# Patient Record
Sex: MALE | Race: OTHER | HISPANIC OR LATINO | URBAN - METROPOLITAN AREA
[De-identification: names, ages, dates, MRNs, and addresses within clinical notes are randomized per-mention and may not be internally consistent; named-entity substitution may affect disease eponyms.]

---

## 2019-06-02 ENCOUNTER — EMERGENCY (EMERGENCY)
Facility: HOSPITAL | Age: 39
LOS: 1 days | Discharge: HOME | End: 2019-06-02
Admitting: EMERGENCY MEDICINE
Payer: COMMERCIAL

## 2019-06-02 VITALS
DIASTOLIC BLOOD PRESSURE: 81 MMHG | RESPIRATION RATE: 16 BRPM | WEIGHT: 227.96 LBS | SYSTOLIC BLOOD PRESSURE: 124 MMHG | HEIGHT: 74 IN | HEART RATE: 65 BPM | TEMPERATURE: 98 F | OXYGEN SATURATION: 98 %

## 2019-06-02 DIAGNOSIS — R51 HEADACHE: ICD-10-CM

## 2019-06-02 PROCEDURE — 99284 EMERGENCY DEPT VISIT MOD MDM: CPT | Mod: 25

## 2019-06-02 PROCEDURE — 99284 EMERGENCY DEPT VISIT MOD MDM: CPT

## 2019-06-02 PROCEDURE — 96375 TX/PRO/DX INJ NEW DRUG ADDON: CPT

## 2019-06-02 PROCEDURE — 96374 THER/PROPH/DIAG INJ IV PUSH: CPT

## 2019-06-02 RX ORDER — SODIUM CHLORIDE 9 MG/ML
2000 INJECTION INTRAMUSCULAR; INTRAVENOUS; SUBCUTANEOUS ONCE
Refills: 0 | Status: COMPLETED | OUTPATIENT
Start: 2019-06-02 | End: 2019-06-02

## 2019-06-02 RX ORDER — METOCLOPRAMIDE HCL 10 MG
10 TABLET ORAL ONCE
Refills: 0 | Status: COMPLETED | OUTPATIENT
Start: 2019-06-02 | End: 2019-06-02

## 2019-06-02 RX ORDER — IBUPROFEN 200 MG
1 TABLET ORAL
Qty: 15 | Refills: 0
Start: 2019-06-02 | End: 2019-06-06

## 2019-06-02 RX ORDER — KETOROLAC TROMETHAMINE 30 MG/ML
15 SYRINGE (ML) INJECTION ONCE
Refills: 0 | Status: DISCONTINUED | OUTPATIENT
Start: 2019-06-02 | End: 2019-06-02

## 2019-06-02 RX ADMIN — SODIUM CHLORIDE 1000 MILLILITER(S): 9 INJECTION INTRAMUSCULAR; INTRAVENOUS; SUBCUTANEOUS at 13:01

## 2019-06-02 RX ADMIN — Medication 15 MILLIGRAM(S): at 13:02

## 2019-06-02 RX ADMIN — Medication 10 MILLIGRAM(S): at 13:01

## 2019-06-02 NOTE — ED PROVIDER NOTE - CARE PROVIDER_API CALL
Deshawn Lucio)  Neurology; Neuromuscular Medicine  [address], [address] [place] [address]  Phone: [phone]  Fax: [phone]  Follow Up Time:

## 2019-06-02 NOTE — ED PROVIDER NOTE - NSFOLLOWUPINSTRUCTIONS_ED_ALL_ED_FT
Cefalea migrañosa  Migraine Headache  Introducción  Image   Abi cefalea migrañosa es un dolor intenso y punzante en toney o ambos lados de la vincent. Las migrañas también pueden causar otros síntomas, arturo náuseas, vómitos y sensibilidad a la david y el ruido.    ¿Cuáles son las causas?  Hay ciertos factores que pueden provocar migrañas, arturo los siguientes:  Alcohol.  Fumar.  Medicamentos, por ejemplo:  Medicamentos para aliviar el dolor torácico (nitroglicerina).  Píldoras anticonceptivas.  Comprimidos de estrógeno.  Ciertos medicamentos para la presión arterial.  Quesos curados, chocolate o cafeína.  Los alimentos o las bebidas que contienen nitratos, glutamato, aspartamo o tiramina.  Realizar actividad física.  Otros factores que pueden provocar migraña incluyen los siguientes:  La menstruación  Embarazo.  Hambre.  Estrés, poco o demasiado sueño, o fatiga.  Los cambios climáticos.  ¿Qué incrementa el riesgo?  Los siguientes factores pueden hacer que usted sea más propenso a tener migrañas:  La edad. Los riesgos aumentan con la edad.  Antecedentes familiares de migraña.  Ser de rasheeda caucásica.  Depresión y ansiedad.  Obesidad.  Ser hayden.  Tener un agujero en el corazón (persistencia del agujero oval) u otros problemas cardíacos.  ¿Cuáles son los signos o los síntomas?  El principal síntoma de esta afección es el dolor intenso y punzante. El dolor:  Puede aparecer en cualquier región de la vincent, tanto de un lado arturo de ambos.  Puede interferir con las actividades de la ruddy cotidiana.  Puede empeorar con la actividad física.  Puede empeorar ante la exposición a luces brillantes o a ruidos jayson.  Otros síntomas pueden incluir lo siguiente:  Náuseas.  Vómitos.  Mareos.  Sensibilidad general a las luces brillantes, a los ruidos jayson o a los olores.  Antes de sufrir abi migraña, puede percibir señales de advertencia (aura). Un aura puede incluir:  Betty luces intermitentes o tener puntos ciegos.  Betty puntos brillantes, halos o líneas en zigzag.  Tener abi visión en túnel o visión borrosa.  Sentir entumecimiento u hormigueo.  Tener dificultad para hablar.  Debilidad muscular.  ¿Cómo se diagnostica?  La cefalea migrañosa se diagnostica en función de lo siguiente:  Meryl síntomas.  Un examen físico.  Estudios, arturo abi tomografía computarizada o abi resonancia magnética de la vincent. Estos estudios de diagnóstico por imágenes pueden ayudar a descartar otras causas de cefalea.  Abi muestra de líquido cefalorraquídeo (punción lumbar) para analizar (análisis de líquido cefalorraquídeo o análisis de LCR).  ¿Cómo se trata?  Las cefaleas migrañosas suelen tratarse con medicamentos que:  Alivian el dolor.  Alivian las náuseas.  Evitan la recurrencia de las migrañas.  El tratamiento también puede incluir lo siguiente:  Acupuntura.  Cambios en el estilo de ruddy, arturo evitar alimentos que provoquen migraña.  Siga estas instrucciones en nelson casa:  Medicamentos     [place] los medicamentos de venta gurvinder y los recetados solamente arturo se lo haya indicado el médico.  No conduzca ni use maquinaria pesada mientras celina analgésicos recetados.  A fin de prevenir o tratar el estreñimiento mientras celina analgésicos recetados, el médico puede recomendarle lo siguiente:  Noy suficiente líquido para mantener la orina alee o de color amarillo pálido.  Balwinder medicamentos recetados o de venta gurvinder.  Consumir alimentos ricos en fibra, arturo frutas y verduras frescas, cereales integrales y frijoles.  Limitar el consumo de alimentos con alto contenido de grasas y azúcares procesados, arturo alimentos fritos o dulces.  Estilo de ruddy     Evite el consumo de alcohol.  No consuma ningún producto que contenga nicotina o tabaco, arturo cigarrillos y cigarrillos electrónicos. Si necesita ayuda para dejar de fumar, consulte al médico.  Duerma arturo mínimo 8 horas todas las noches.  Evite las situaciones de estrés.  Instrucciones generales     Image   Lleve un registro diario para averiguar lo que puede provocar las cefaleas migrañosas. Por ejemplo, escriba:  Lo que usted come y alice.  Cuánto tiempo duerme.  Algún cambio en nelson dieta o en los medicamentos.  Si tiene abi migraña:  Evite los factores que empeoren los síntomas, arturo las luces brillantes.  Resulta útil acostarse en abi habitación oscura y silenciosa.  No conduzca vehículos ni opere maquinaria pesada.  Pregúntele al médico qué actividades son seguras para usted cuando tiene síntomas.  Concurra a todas las visitas de control arturo se lo haya indicado el médico. Quinton es importante.  Comuníquese con un médico si:  Tiene síntomas de migraña distintos o más intensos que los habituales.  Solicite ayuda de inmediato si:  La migraña se hace cada vez más intensa.  Tiene fiebre.  Presenta rigidez en el mady.  Tiene pérdida de visión.  Siente debilidad en los músculos o que no puede controlarlos.  Comienza a perder el equilibrio con frecuencia.  Comienza a tener dificultades para caminar.  Se desmaya.  Esta información no tiene arturo fin reemplazar el consejo del médico. Asegúrese de hacerle al médico cualquier pregunta que tenga.

## 2019-06-02 NOTE — ED ADULT TRIAGE NOTE - CHIEF COMPLAINT QUOTE
"I have been having headache for about 1 week and it feels like a pulling to the right side of my head with tingling. "

## 2019-06-02 NOTE — ED PROVIDER NOTE - CLINICAL SUMMARY MEDICAL DECISION MAKING FREE TEXT BOX
39 y/o male with right sided HA x2 weeks. No double vision, confusion, difficulty ambulating and no recent injury. Not worst HA of life without sudden onset. Do not suspect SAH. No hx of aneurysm. Well-appearing. No fever/stiff neck. NO recent hx of illness - do not suspect meningitis. Neuro intact. Pain treated and alleviated. Pt without signs of ams well appearing. advised to continue nsaid and follow up with Neurology. Pt agrees with plan and is requesting for dc as wife who is also a pt in the ed at the same time has just been discharge.

## 2019-06-03 ENCOUNTER — INBOUND DOCUMENT (OUTPATIENT)
Age: 39
End: 2019-06-03

## 2019-06-03 PROBLEM — Z00.00 ENCOUNTER FOR PREVENTIVE HEALTH EXAMINATION: Status: ACTIVE | Noted: 2019-06-03

## 2021-04-26 NOTE — ED PROVIDER NOTE - OBJECTIVE STATEMENT
Raj #[id number]  37 y/o male with no PMHx who is otherwise healthy is present in the ED c/o HA x2 weeks. Pt reports gradual onset of discomfort located on the right side of his head without radiation. Pt reports pain is constant and increases in severity and frequency associated with mild nausea. He describes an intermittent tingling sensation to the top of his head. He denies taking anything for his headaches. Pt denies the following: fever, recent illness, neck stiffness/back pain, syncope, dizziness/lightheadedness, vomiting, blurred vision, double vision, slurred speech, confusion, numbness/tingling of extremities, sob, chest pain, hx of tbi/seizures, difficulty with balance. Pt denies hx of migraines in the past. Female

## 2021-08-13 ENCOUNTER — APPOINTMENT (OUTPATIENT)
Dept: UROLOGY | Facility: CLINIC | Age: 41
End: 2021-08-13
Payer: MEDICAID

## 2021-08-13 ENCOUNTER — NON-APPOINTMENT (OUTPATIENT)
Age: 41
End: 2021-08-13

## 2021-08-13 VITALS
WEIGHT: 240 LBS | HEIGHT: 74 IN | HEART RATE: 67 BPM | SYSTOLIC BLOOD PRESSURE: 112 MMHG | BODY MASS INDEX: 30.8 KG/M2 | DIASTOLIC BLOOD PRESSURE: 75 MMHG | TEMPERATURE: 97.8 F

## 2021-08-13 PROCEDURE — 93975 VASCULAR STUDY: CPT

## 2021-08-13 PROCEDURE — 99204 OFFICE O/P NEW MOD 45 MIN: CPT | Mod: 25

## 2021-08-13 PROCEDURE — 99072 ADDL SUPL MATRL&STAF TM PHE: CPT

## 2021-08-13 NOTE — PHYSICAL EXAM
[Urethral Meatus] : meatus normal [Penis Abnormality] : normal uncircumcised penis [Urinary Bladder Findings] : the bladder was normal on palpation [Scrotum] : the scrotum was normal [Epididymis] : the epididymides were normal [Testes Tenderness] : no tenderness of the testes [Testes Mass (___cm)] : there were no testicular masses [FreeTextEntry1] : bialteral cord fullness. 4cc soft testes.

## 2021-08-13 NOTE — HISTORY OF PRESENT ILLNESS
[FreeTextEntry1] : 40M  Dianelys Dallas (34F). male has 18 year old. wife with previous  with another partner. negtavie female factor evalatuion. wife underwent teratoma surgery\par s/p 2021 bilateral varicocelectomy in DR\par LH 9.6\par FSH 16.5\par \par 2021: \par 1 ml/10 non-motile, 2 motile sperm\par repeat exam - unclear report ?azoospermia\par no additonal compalints\par

## 2021-08-13 NOTE — ASSESSMENT
[FreeTextEntry1] : hypogonadsim\par repeat scortal ultrasound - right 3.5mm varicocele\par no left sided varicocele\par hormonal evaluaiton - TT FSH E2 LH \par Y microdeletion\par karyotrype\par recommend IVF\par needs repeat SA with cryopreservation \par f/u after IVF discussion

## 2021-08-16 LAB
ESTRADIOL SERPL-MCNC: 31 PG/ML
FSH SERPL-MCNC: 18.1 IU/L
LH SERPL-ACNC: 16.5 IU/L
TESTOST BND SERPL-MCNC: 8.1 PG/ML
TESTOSTERONE TOTAL S: 296 NG/DL

## 2021-08-18 ENCOUNTER — NON-APPOINTMENT (OUTPATIENT)
Age: 41
End: 2021-08-18

## 2021-08-25 LAB — Y CHROMOSOME MICRODELETION, DNA ANALYSIS: NORMAL

## 2021-09-02 ENCOUNTER — APPOINTMENT (OUTPATIENT)
Dept: UROLOGY | Facility: CLINIC | Age: 41
End: 2021-09-02
Payer: MEDICAID

## 2021-09-02 VITALS — TEMPERATURE: 98.1 F | DIASTOLIC BLOOD PRESSURE: 80 MMHG | SYSTOLIC BLOOD PRESSURE: 129 MMHG | HEART RATE: 59 BPM

## 2021-09-02 PROCEDURE — 99214 OFFICE O/P EST MOD 30 MIN: CPT

## 2021-09-02 PROCEDURE — 99072 ADDL SUPL MATRL&STAF TM PHE: CPT

## 2021-09-02 NOTE — HISTORY OF PRESENT ILLNESS
[FreeTextEntry1] : returns for followup\par cryptozoospermia\par rare sperm noted\par now on clomid x few weeks\par underwent bialteral varicocelecotmy in  in 2/2021\par no clinical varicocele on exam\par wife now being seen for IVF eval

## 2021-09-02 NOTE — LETTER BODY
[Dear  ___] : Dear  [unfilled], [FreeTextEntry2] : Clay Armando MD\par [facility]\par [address] [facility]\par [address]\[place] [address] [FreeTextEntry1] : I had the pleasure of seeing TRAMAINE DALLAS, a 40 year old man,  to your patient Dianelys Dallas, in the office in consultation today. Please see the attached note for full details.\par \par Thank you very much for allowing me to participate in the care of this patient. If you have any questions please feel free to call me at any time. \par \par \par Sincerely yours,\par \par \par \par Abraham Castaneda MD, ANÍBAL\par Director, Male Fertility and Microsurgery\par  of Urology\par [facility]\par

## 2021-09-02 NOTE — ASSESSMENT
[FreeTextEntry1] : hypogonadism\par continue clomid \par TT FSH LH E2 today\par proceed to IVF \par await repeat SA results\par no additioanl reversible male factor abnormality\par if poor sperm noted proceed to TESE

## 2021-09-03 LAB
ALBUMIN SERPL ELPH-MCNC: 4.8 G/DL
ALP BLD-CCNC: 69 U/L
ALT SERPL-CCNC: 17 U/L
ANION GAP SERPL CALC-SCNC: 10 MMOL/L
AST SERPL-CCNC: 19 U/L
BASOPHILS # BLD AUTO: 0.01 K/UL
BASOPHILS NFR BLD AUTO: 0.2 %
BILIRUB SERPL-MCNC: 0.5 MG/DL
BUN SERPL-MCNC: 15 MG/DL
CALCIUM SERPL-MCNC: 9.9 MG/DL
CHLORIDE SERPL-SCNC: 103 MMOL/L
CO2 SERPL-SCNC: 27 MMOL/L
CREAT SERPL-MCNC: 1.08 MG/DL
EOSINOPHIL # BLD AUTO: 0.06 K/UL
EOSINOPHIL NFR BLD AUTO: 1.2 %
ESTRADIOL SERPL-MCNC: 51 PG/ML
FSH SERPL-MCNC: 38.4 IU/L
GLUCOSE SERPL-MCNC: 92 MG/DL
HCT VFR BLD CALC: 35.3 %
HGB BLD-MCNC: 11.9 G/DL
IMM GRANULOCYTES NFR BLD AUTO: 0.2 %
LH SERPL-ACNC: 30.7 IU/L
LYMPHOCYTES # BLD AUTO: 1.61 K/UL
LYMPHOCYTES NFR BLD AUTO: 32.3 %
MAN DIFF?: NORMAL
MCHC RBC-ENTMCNC: 31.7 PG
MCHC RBC-ENTMCNC: 33.7 GM/DL
MCV RBC AUTO: 94.1 FL
MONOCYTES # BLD AUTO: 0.47 K/UL
MONOCYTES NFR BLD AUTO: 9.4 %
NEUTROPHILS # BLD AUTO: 2.83 K/UL
NEUTROPHILS NFR BLD AUTO: 56.7 %
PLATELET # BLD AUTO: 220 K/UL
POTASSIUM SERPL-SCNC: 4.9 MMOL/L
PROT SERPL-MCNC: 7.5 G/DL
RBC # BLD: 3.75 M/UL
RBC # FLD: 13.9 %
SODIUM SERPL-SCNC: 140 MMOL/L
WBC # FLD AUTO: 4.99 K/UL

## 2021-09-09 LAB
TESTOST BND SERPL-MCNC: 10.9 PG/ML
TESTOSTERONE TOTAL S: 527 NG/DL

## 2021-11-06 ENCOUNTER — TRANSCRIPTION ENCOUNTER (OUTPATIENT)
Age: 41
End: 2021-11-06

## 2021-11-06 ENCOUNTER — INPATIENT (INPATIENT)
Facility: HOSPITAL | Age: 41
LOS: 0 days | Discharge: HOME | DRG: 175 | End: 2021-11-06
Attending: HOSPITALIST | Admitting: HOSPITALIST
Payer: MEDICAID

## 2021-11-06 VITALS
TEMPERATURE: 101 F | WEIGHT: 244.93 LBS | HEART RATE: 111 BPM | RESPIRATION RATE: 16 BRPM | SYSTOLIC BLOOD PRESSURE: 105 MMHG | DIASTOLIC BLOOD PRESSURE: 71 MMHG | OXYGEN SATURATION: 99 % | HEIGHT: 74 IN

## 2021-11-06 VITALS
TEMPERATURE: 99 F | RESPIRATION RATE: 18 BRPM | DIASTOLIC BLOOD PRESSURE: 64 MMHG | HEART RATE: 82 BPM | OXYGEN SATURATION: 95 % | SYSTOLIC BLOOD PRESSURE: 103 MMHG

## 2021-11-06 DIAGNOSIS — Z29.9 ENCOUNTER FOR PROPHYLACTIC MEASURES, UNSPECIFIED: ICD-10-CM

## 2021-11-06 DIAGNOSIS — R65.10 SYSTEMIC INFLAMMATORY RESPONSE SYNDROME (SIRS) OF NON-INFECTIOUS ORIGIN WITHOUT ACUTE ORGAN DYSFUNCTION: ICD-10-CM

## 2021-11-06 DIAGNOSIS — J90 PLEURAL EFFUSION, NOT ELSEWHERE CLASSIFIED: ICD-10-CM

## 2021-11-06 DIAGNOSIS — I26.99 OTHER PULMONARY EMBOLISM WITHOUT ACUTE COR PULMONALE: ICD-10-CM

## 2021-11-06 DIAGNOSIS — Z98.890 OTHER SPECIFIED POSTPROCEDURAL STATES: Chronic | ICD-10-CM

## 2021-11-06 DIAGNOSIS — J98.4 OTHER DISORDERS OF LUNG: ICD-10-CM

## 2021-11-06 LAB
ALBUMIN SERPL ELPH-MCNC: 4.7 G/DL — SIGNIFICANT CHANGE UP (ref 3.3–5)
ALP SERPL-CCNC: 78 U/L — SIGNIFICANT CHANGE UP (ref 40–120)
ALT FLD-CCNC: 20 U/L — SIGNIFICANT CHANGE UP (ref 10–45)
ANION GAP SERPL CALC-SCNC: 9 MMOL/L — SIGNIFICANT CHANGE UP (ref 5–17)
APTT BLD: 26.3 SEC — LOW (ref 27.5–35.5)
APTT BLD: >200 SEC — CRITICAL HIGH (ref 27.5–35.5)
AST SERPL-CCNC: 19 U/L — SIGNIFICANT CHANGE UP (ref 10–40)
BASOPHILS # BLD AUTO: 0.01 K/UL — SIGNIFICANT CHANGE UP (ref 0–0.2)
BASOPHILS NFR BLD AUTO: 0.1 % — SIGNIFICANT CHANGE UP (ref 0–2)
BILIRUB SERPL-MCNC: 0.5 MG/DL — SIGNIFICANT CHANGE UP (ref 0.2–1.2)
BUN SERPL-MCNC: 12 MG/DL — SIGNIFICANT CHANGE UP (ref 7–23)
CALCIUM SERPL-MCNC: 9.7 MG/DL — SIGNIFICANT CHANGE UP (ref 8.4–10.5)
CHLORIDE SERPL-SCNC: 101 MMOL/L — SIGNIFICANT CHANGE UP (ref 96–108)
CO2 SERPL-SCNC: 28 MMOL/L — SIGNIFICANT CHANGE UP (ref 22–31)
CREAT SERPL-MCNC: 1.26 MG/DL — SIGNIFICANT CHANGE UP (ref 0.5–1.3)
CRP SERPL-MCNC: 44.5 MG/L — HIGH (ref 0–4)
D DIMER BLD IA.RAPID-MCNC: 547 NG/ML DDU — HIGH
EOSINOPHIL # BLD AUTO: 0.02 K/UL — SIGNIFICANT CHANGE UP (ref 0–0.5)
EOSINOPHIL NFR BLD AUTO: 0.2 % — SIGNIFICANT CHANGE UP (ref 0–6)
FERRITIN SERPL-MCNC: 179 NG/ML — SIGNIFICANT CHANGE UP (ref 30–400)
GLUCOSE SERPL-MCNC: 104 MG/DL — HIGH (ref 70–99)
HCT VFR BLD CALC: 40.9 % — SIGNIFICANT CHANGE UP (ref 39–50)
HGB BLD-MCNC: 14.3 G/DL — SIGNIFICANT CHANGE UP (ref 13–17)
IMM GRANULOCYTES NFR BLD AUTO: 0.3 % — SIGNIFICANT CHANGE UP (ref 0–1.5)
INR BLD: 1.06 — SIGNIFICANT CHANGE UP (ref 0.88–1.16)
LACTATE SERPL-SCNC: 1 MMOL/L — SIGNIFICANT CHANGE UP (ref 0.5–2)
LYMPHOCYTES # BLD AUTO: 1.49 K/UL — SIGNIFICANT CHANGE UP (ref 1–3.3)
LYMPHOCYTES # BLD AUTO: 14.6 % — SIGNIFICANT CHANGE UP (ref 13–44)
MCHC RBC-ENTMCNC: 31.2 PG — SIGNIFICANT CHANGE UP (ref 27–34)
MCHC RBC-ENTMCNC: 35 GM/DL — SIGNIFICANT CHANGE UP (ref 32–36)
MCV RBC AUTO: 89.3 FL — SIGNIFICANT CHANGE UP (ref 80–100)
MONOCYTES # BLD AUTO: 1.11 K/UL — HIGH (ref 0–0.9)
MONOCYTES NFR BLD AUTO: 10.9 % — SIGNIFICANT CHANGE UP (ref 2–14)
NEUTROPHILS # BLD AUTO: 7.52 K/UL — HIGH (ref 1.8–7.4)
NEUTROPHILS NFR BLD AUTO: 73.9 % — SIGNIFICANT CHANGE UP (ref 43–77)
NRBC # BLD: 0 /100 WBCS — SIGNIFICANT CHANGE UP (ref 0–0)
NT-PROBNP SERPL-SCNC: 26 PG/ML — SIGNIFICANT CHANGE UP (ref 0–300)
PLATELET # BLD AUTO: 215 K/UL — SIGNIFICANT CHANGE UP (ref 150–400)
POTASSIUM SERPL-MCNC: 4 MMOL/L — SIGNIFICANT CHANGE UP (ref 3.5–5.3)
POTASSIUM SERPL-SCNC: 4 MMOL/L — SIGNIFICANT CHANGE UP (ref 3.5–5.3)
PROCALCITONIN SERPL-MCNC: 0.08 NG/ML — SIGNIFICANT CHANGE UP (ref 0.02–0.1)
PROT SERPL-MCNC: 8.5 G/DL — HIGH (ref 6–8.3)
PROTHROM AB SERPL-ACNC: 12.7 SEC — SIGNIFICANT CHANGE UP (ref 10.6–13.6)
RAPID RVP RESULT: SIGNIFICANT CHANGE UP
RBC # BLD: 4.58 M/UL — SIGNIFICANT CHANGE UP (ref 4.2–5.8)
RBC # FLD: 12.9 % — SIGNIFICANT CHANGE UP (ref 10.3–14.5)
SARS-COV-2 RNA SPEC QL NAA+PROBE: SIGNIFICANT CHANGE UP
SARS-COV-2 RNA SPEC QL NAA+PROBE: SIGNIFICANT CHANGE UP
SODIUM SERPL-SCNC: 138 MMOL/L — SIGNIFICANT CHANGE UP (ref 135–145)
TROPONIN T SERPL-MCNC: 0.01 NG/ML — SIGNIFICANT CHANGE UP (ref 0–0.01)
WBC # BLD: 10.18 K/UL — SIGNIFICANT CHANGE UP (ref 3.8–10.5)
WBC # FLD AUTO: 10.18 K/UL — SIGNIFICANT CHANGE UP (ref 3.8–10.5)

## 2021-11-06 PROCEDURE — 96367 TX/PROPH/DG ADDL SEQ IV INF: CPT

## 2021-11-06 PROCEDURE — 93005 ELECTROCARDIOGRAM TRACING: CPT

## 2021-11-06 PROCEDURE — 85610 PROTHROMBIN TIME: CPT

## 2021-11-06 PROCEDURE — 71275 CT ANGIOGRAPHY CHEST: CPT | Mod: 26,MG

## 2021-11-06 PROCEDURE — 84145 PROCALCITONIN (PCT): CPT

## 2021-11-06 PROCEDURE — 99053 MED SERV 10PM-8AM 24 HR FAC: CPT

## 2021-11-06 PROCEDURE — G1004: CPT

## 2021-11-06 PROCEDURE — 84484 ASSAY OF TROPONIN QUANT: CPT

## 2021-11-06 PROCEDURE — 71275 CT ANGIOGRAPHY CHEST: CPT | Mod: MG

## 2021-11-06 PROCEDURE — 99285 EMERGENCY DEPT VISIT HI MDM: CPT

## 2021-11-06 PROCEDURE — 85379 FIBRIN DEGRADATION QUANT: CPT

## 2021-11-06 PROCEDURE — 87040 BLOOD CULTURE FOR BACTERIA: CPT

## 2021-11-06 PROCEDURE — 0225U NFCT DS DNA&RNA 21 SARSCOV2: CPT

## 2021-11-06 PROCEDURE — 85730 THROMBOPLASTIN TIME PARTIAL: CPT

## 2021-11-06 PROCEDURE — 71045 X-RAY EXAM CHEST 1 VIEW: CPT

## 2021-11-06 PROCEDURE — 96361 HYDRATE IV INFUSION ADD-ON: CPT

## 2021-11-06 PROCEDURE — 71045 X-RAY EXAM CHEST 1 VIEW: CPT | Mod: 26

## 2021-11-06 PROCEDURE — 99223 1ST HOSP IP/OBS HIGH 75: CPT | Mod: GC

## 2021-11-06 PROCEDURE — 83605 ASSAY OF LACTIC ACID: CPT

## 2021-11-06 PROCEDURE — 80053 COMPREHEN METABOLIC PANEL: CPT

## 2021-11-06 PROCEDURE — U0005: CPT

## 2021-11-06 PROCEDURE — 93010 ELECTROCARDIOGRAM REPORT: CPT

## 2021-11-06 PROCEDURE — 82728 ASSAY OF FERRITIN: CPT

## 2021-11-06 PROCEDURE — U0003: CPT

## 2021-11-06 PROCEDURE — 83880 ASSAY OF NATRIURETIC PEPTIDE: CPT

## 2021-11-06 PROCEDURE — 86140 C-REACTIVE PROTEIN: CPT

## 2021-11-06 PROCEDURE — 96365 THER/PROPH/DIAG IV INF INIT: CPT

## 2021-11-06 PROCEDURE — 85025 COMPLETE CBC W/AUTO DIFF WBC: CPT

## 2021-11-06 PROCEDURE — 36415 COLL VENOUS BLD VENIPUNCTURE: CPT

## 2021-11-06 PROCEDURE — 99221 1ST HOSP IP/OBS SF/LOW 40: CPT | Mod: GC

## 2021-11-06 RX ORDER — HEPARIN SODIUM 5000 [USP'U]/ML
4500 INJECTION INTRAVENOUS; SUBCUTANEOUS EVERY 6 HOURS
Refills: 0 | Status: DISCONTINUED | OUTPATIENT
Start: 2021-11-06 | End: 2021-11-06

## 2021-11-06 RX ORDER — ACETAMINOPHEN 500 MG
650 TABLET ORAL EVERY 6 HOURS
Refills: 0 | Status: DISCONTINUED | OUTPATIENT
Start: 2021-11-06 | End: 2021-11-06

## 2021-11-06 RX ORDER — HEPARIN SODIUM 5000 [USP'U]/ML
9000 INJECTION INTRAVENOUS; SUBCUTANEOUS ONCE
Refills: 0 | Status: COMPLETED | OUTPATIENT
Start: 2021-11-06 | End: 2021-11-06

## 2021-11-06 RX ORDER — SODIUM CHLORIDE 9 MG/ML
1000 INJECTION INTRAMUSCULAR; INTRAVENOUS; SUBCUTANEOUS ONCE
Refills: 0 | Status: COMPLETED | OUTPATIENT
Start: 2021-11-06 | End: 2021-11-06

## 2021-11-06 RX ORDER — APIXABAN 2.5 MG/1
1 TABLET, FILM COATED ORAL
Qty: 75 | Refills: 0
Start: 2021-11-06

## 2021-11-06 RX ORDER — INFLUENZA VIRUS VACCINE 15; 15; 15; 15 UG/.5ML; UG/.5ML; UG/.5ML; UG/.5ML
0.5 SUSPENSION INTRAMUSCULAR ONCE
Refills: 0 | Status: DISCONTINUED | OUTPATIENT
Start: 2021-11-06 | End: 2021-11-06

## 2021-11-06 RX ORDER — CEFTRIAXONE 500 MG/1
1000 INJECTION, POWDER, FOR SOLUTION INTRAMUSCULAR; INTRAVENOUS ONCE
Refills: 0 | Status: COMPLETED | OUTPATIENT
Start: 2021-11-06 | End: 2021-11-06

## 2021-11-06 RX ORDER — HEPARIN SODIUM 5000 [USP'U]/ML
INJECTION INTRAVENOUS; SUBCUTANEOUS
Qty: 25000 | Refills: 0 | Status: DISCONTINUED | OUTPATIENT
Start: 2021-11-06 | End: 2021-11-06

## 2021-11-06 RX ORDER — ACETAMINOPHEN 500 MG
975 TABLET ORAL ONCE
Refills: 0 | Status: COMPLETED | OUTPATIENT
Start: 2021-11-06 | End: 2021-11-06

## 2021-11-06 RX ORDER — ENOXAPARIN SODIUM 100 MG/ML
100 INJECTION SUBCUTANEOUS EVERY 12 HOURS
Refills: 0 | Status: DISCONTINUED | OUTPATIENT
Start: 2021-11-06 | End: 2021-11-06

## 2021-11-06 RX ORDER — HEPARIN SODIUM 5000 [USP'U]/ML
9000 INJECTION INTRAVENOUS; SUBCUTANEOUS EVERY 6 HOURS
Refills: 0 | Status: DISCONTINUED | OUTPATIENT
Start: 2021-11-06 | End: 2021-11-06

## 2021-11-06 RX ORDER — AZITHROMYCIN 500 MG/1
500 TABLET, FILM COATED ORAL ONCE
Refills: 0 | Status: COMPLETED | OUTPATIENT
Start: 2021-11-06 | End: 2021-11-06

## 2021-11-06 RX ORDER — CLOMIPHENE CITRATE 50 MG/1
1 TABLET ORAL
Qty: 0 | Refills: 0 | DISCHARGE

## 2021-11-06 RX ADMIN — CEFTRIAXONE 1000 MILLIGRAM(S): 500 INJECTION, POWDER, FOR SOLUTION INTRAMUSCULAR; INTRAVENOUS at 04:30

## 2021-11-06 RX ADMIN — AZITHROMYCIN 500 MILLIGRAM(S): 500 TABLET, FILM COATED ORAL at 05:15

## 2021-11-06 RX ADMIN — CEFTRIAXONE 100 MILLIGRAM(S): 500 INJECTION, POWDER, FOR SOLUTION INTRAMUSCULAR; INTRAVENOUS at 03:58

## 2021-11-06 RX ADMIN — AZITHROMYCIN 255 MILLIGRAM(S): 500 TABLET, FILM COATED ORAL at 04:52

## 2021-11-06 RX ADMIN — HEPARIN SODIUM 9000 UNIT(S): 5000 INJECTION INTRAVENOUS; SUBCUTANEOUS at 05:27

## 2021-11-06 RX ADMIN — Medication 975 MILLIGRAM(S): at 01:50

## 2021-11-06 RX ADMIN — SODIUM CHLORIDE 1000 MILLILITER(S): 9 INJECTION INTRAMUSCULAR; INTRAVENOUS; SUBCUTANEOUS at 03:41

## 2021-11-06 RX ADMIN — SODIUM CHLORIDE 1000 MILLILITER(S): 9 INJECTION INTRAMUSCULAR; INTRAVENOUS; SUBCUTANEOUS at 01:51

## 2021-11-06 RX ADMIN — Medication 975 MILLIGRAM(S): at 04:04

## 2021-11-06 RX ADMIN — ENOXAPARIN SODIUM 100 MILLIGRAM(S): 100 INJECTION SUBCUTANEOUS at 12:20

## 2021-11-06 RX ADMIN — HEPARIN SODIUM 2000 UNIT(S)/HR: 5000 INJECTION INTRAVENOUS; SUBCUTANEOUS at 05:29

## 2021-11-06 NOTE — H&P ADULT - NSHPSOCIALHISTORY_GEN_ALL_CORE
Pt denies any tobacco, alcohol, drug or marijuana use  Lives at home with wife, can perform all ADLs and IADLs independently, ambulates without assistance  Works as a   Does not regularly exercise

## 2021-11-06 NOTE — H&P ADULT - PROBLEM SELECTOR PLAN 2
F: none  E: K<4, Mg<2  N: Regular  D: Heparin gtt    Code: Full  Dispo: ANGIE Meeting 2/4 SIRS criteria, fever to 100.7F and  in ED; pt no longer meeting SIRS criteria. CXR appeared to show LLL consolidation and CT angio preliminary read suggested peripheral wedge-shaped consolidation with ground-glass opacity likely PNA and infarction and possible cavitation and small L pleural effusion. Pt is unvaccinated for COVID, however COVID negative x2, RVP negative, no symptoms of URI. In the absence of elevated WBC and other URI symptoms, fever and tachycardia is more likely 2/2 to acute PE vs PNA or other infectious process. S/P CTX 1g and azithromycin 500mg in ED.   Plan:   - Monitor for systemic signs of infection Meeting 2/4 SIRS criteria, fever to 100.7F and  in ED; pt no longer meeting SIRS criteria. CXR appeared to show LLL consolidation and CT angio preliminary read suggested peripheral wedge-shaped consolidation with ground-glass opacity likely PNA and infarction and possible cavitation and small L pleural effusion. Pt is unvaccinated for COVID, however COVID negative x2, RVP negative, no symptoms of URI. In the absence of elevated WBC and other URI symptoms, fever and tachycardia is more likely 2/2 to acute PE vs PNA or other infectious process. S/P CTX 1g and azithromycin 500mg in ED.   Plan:   - F/U blood cx  - Monitor for systemic signs of infection

## 2021-11-06 NOTE — DISCHARGE NOTE NURSING/CASE MANAGEMENT/SOCIAL WORK - PATIENT PORTAL LINK FT
You can access the FollowMyHealth Patient Portal offered by [facility] by registering at the following website: [email or web address]. By joining [facility] FollowMyHealth portal, you will also be able to view your health information using other applications (apps) compatible with our system.

## 2021-11-06 NOTE — DISCHARGE NOTE PROVIDER - NSDCFUADDAPPT_GEN_ALL_CORE_FT
Please call the office of Dr. Sara Klein to set up a follow-up appointment within the next 1-2 weeks. If the above number doesn't work, call [phone] to get to the pulmonology fellow clinic.

## 2021-11-06 NOTE — DISCHARGE NOTE PROVIDER - NSDCMRMEDTOKEN_GEN_ALL_CORE_FT
clomiPHENE 50 mg oral tablet: 1 tab(s) orally once a day   mg oral tablet: 1 tab(s) orally 3 times a day    Eliquis Starter Pack for Treatment of DVT and PE 5 mg oral tablet: 1 tab(s) orally 2 times a day

## 2021-11-06 NOTE — ED ADULT TRIAGE NOTE - OTHER COMPLAINTS
L sided CP x 3-4 days, "stabbing", worse with coughing or laying down, (+) dry cough and sore throat

## 2021-11-06 NOTE — DISCHARGE NOTE PROVIDER - HOSPITAL COURSE
#Discharge: do not delete    Patient is 40 yo M with past medical history of clomiphene use  Presented with chest pain, found to have acute pulmonary embolism  Problem List/Main Diagnoses (system-based):   Pulmonary embolism, acute, provoked by sedentary work and clomiphene  - Eliquis  - f/u [facility] Pulmonology clinic ([phone])  Inpatient treatment course:   41 M  taking clomiphene for infertility presented with pleuritic chest pain of 5-6 days. Low grade fevers and tachycardia on admission with positive D-dimer, CT PE positive for LLL embolism with infarction and local necrosis. Started on heparin drip then transitioned to enoxaparin.   New medications: Apixaban 10mg bid  Labs to be followed outpatient:   Exam to be followed outpatient:

## 2021-11-06 NOTE — ED PROVIDER NOTE - MUSCULOSKELETAL, MLM
Spine appears normal, range of motion is not limited, no muscle or joint tenderness, distal pulses good, sensation and motor intact

## 2021-11-06 NOTE — DISCHARGE NOTE PROVIDER - CARE PROVIDER_API CALL
Sara Klein (DO)  Critical Care Medicine; Internal Medicine; Pulmonary Disease  [address] [address]  Phone: [phone]  Fax: [phone]  Follow Up Time: 2 weeks

## 2021-11-06 NOTE — CONSULT NOTE ADULT - ASSESSMENT
40yo M minimal former-smoker, not COVID vaccinated, with hx of ?male infertility (on Clomiphene),   Presents with several days of dry cough and pleuritic chest pain,   Found with acute LLL PE with area of pulmonary infarction/cavitation     #Pulmonary Embolism - LLL; sPESI 0; Likely low-risk given HD-stability and lack of elevated Trop or BNP (no TTE to definitively rule-out RH strain, however unlikely); Likely provoked in the setting of Clomiphene use and reduced mobility  #r/o Pulmonary Infarction vs Pulmonary Cavitation - Area of likely lung infarction in area of LLL Pulmonary Embolism; no signs/symptoms to suggest cavitary lung lesion given lack of fevers/chills, weight loss, poor appetite, immunosuppression or otherwise high-risk behavior for such conditions; Presence of lung infarction does not portend a worse prognosis in the setting of PE     - Can transition to DOAC for discharge (anticipate 3-6 months of tx given likely provoked nature)    - Can follow-up outpatient Pulmonary for further symptom monitoring and to repeat CT imaging in 4-8 weeks to ensure resolution/improvement of lung infarction/cavitation   - No need for TTE or LE Doppler U/S during this hospitalization   - No indication for Abx or further respiratory work-up   - Stressed on importance of stopping Clomiphene use       Can follow-up with Pulmonary Dr. Sara Klein ([phone]) in 1-2 weeks

## 2021-11-06 NOTE — ED ADULT NURSE NOTE - SUICIDE SCREENING QUESTION 3
Pt here in clinic for Gilenya observation.  Pre-EKG obtained and results verbally given to Dr. Sands over the phone and order received to give 0.5mg Gilenya, medications reviewed, and baseline vitals obtained (to be performed q1 hour for 6 hours).  Pt observed for bradycardia and decreased HR.  Pt remained in clinic the entire duration of the observation without any issues.  Labs drawn per protocol.  Post-EKG obtained and evaluated by Dr. Sands and pt instructed to take 0.5mg Gilenya daily.  Upon discharge, pt instructed to take one tablet Gilenya daily and to call MD if any issues.      Patient denies any side effects at this time. Patient d/c ambulatory at this time.       
No

## 2021-11-06 NOTE — DISCHARGE NOTE NURSING/CASE MANAGEMENT/SOCIAL WORK - NSDCFUADDAPPT_GEN_ALL_CORE_FT
Please call the office of Dr. Sara Klein to set up a follow-up appointment within the next 1-2 weeks. If the above number doesn't work, call [phone] to get to the pulmonology fellow clinic.

## 2021-11-06 NOTE — H&P ADULT - ATTENDING COMMENTS
Patient was seen and examined at bedside on 11/6/2021 at 11 am. He reports continued L sided chest pressure. Denies SOB. ROS is otherwise negative. Vitals, labwork and pertinent imaging reviewed - CT PE as above, EKG showing sinus tachycardia; troponin wnl, BNP - wnl. Physical exam as above.     Plan - pulm consulted given possible cavitary lesion?    -Possible provoked in setting of hormonal therapy?  -Will likely start on Eliquis and plan for d/c pending final pulm recs    Total time spent ~ 32 minutes

## 2021-11-06 NOTE — H&P ADULT - PROBLEM SELECTOR PLAN 1
Pt presented to the ED with dry cough and chest pain on deep inhalation for 5 days. Found to have low grade fever to 100.7 in ED. CT angio preliminary findings of acute segmental/subsegmental PE in the L lower lobe with peripheral wedge-shaped consolidation with ground-glass opacity likely PNA and infarction and possible cavitation and small L pleural effusion. D-Dimer 547. WBC within normal limits, with no symptoms of sore throat, rhinorrhea, dyspnea. Pt is unvaccinated for COVID. In the setting of cough, fever and PE, r/o COVID PNA vs CAP as well. S/P CTX 1g and azithromycin 500mg in ED.  Plan:  - Cont heparin gtt, f/u 10 am PTT (goal 58-99)  - F/U repeat COVID   - Pt presented to the ED with dry cough and chest pain on deep inhalation for 5 days. CT angio preliminary findings of acute segmental/subsegmental PE in the L lower lobe with peripheral wedge-shaped consolidation with ground-glass opacity likely PNA and infarction and possible cavitation and small L pleural effusion. D-Dimer 547. Denies hx of clotting or family hx of clotting conditions. Pt has been using clomiphene for 6 weeks and works as a , seated for up to 10 hrs a day. Possibly provoked by use of clomiphene with sedentary lifestyle making pt more prone to clotting. EKG, NSR with no evidence of R heart strain, PESI score of 69.  Plan:  - Cont heparin gtt, f/u PTT q6hr to therapeutic x3 (goal 58-99)  - F/U B/L LE doppler Pt presented to the ED with dry cough and chest pain on deep inhalation for 5 days. CT angio preliminary findings of acute segmental/subsegmental PE in the L lower lobe with peripheral wedge-shaped consolidation with ground-glass opacity likely PNA and infarction and possible cavitation and small L pleural effusion. D-Dimer 547. Denies hx of clotting or family hx of clotting conditions. Pt has been using clomiphene for 6 weeks and works as a , seated for up to 10 hrs a day. Possibly provoked by use of clomiphene with sedentary lifestyle making pt more prone to clotting. EKG, NSR with no evidence of R heart strain, PESI score of 69.  Plan:  - Cont heparin gtt, f/u PTT q6hr to therapeutic x3 (goal 58-99)  - F/U B/L LE doppler  - F/U pulm recs Pt presented to the ED with dry cough and chest pain on deep inhalation for 5 days. CT angio preliminary findings of acute segmental/subsegmental PE in the L lower lobe with peripheral wedge-shaped consolidation with ground-glass opacity likely PNA and infarction and possible cavitation and small L pleural effusion. D-Dimer 547. Denies hx of clotting or family hx of clotting conditions. Pt has been using clomiphene for 6 weeks and works as a , seated for up to 10 hrs a day. Possibly provoked by use of clomiphene with sedentary lifestyle making pt more prone to clotting. EKG, NSR with no evidence of R heart strain, PESI score of 69.  Plan:  - Cont heparin gtt, f/u PTT q6hr to therapeutic x3 (goal 58-99)  - F/U B/L LE doppler  - F/U pulm recs, CT suspicious for cavitary lesion  - F/U TTE Pt presented to the ED with dry cough and chest pain on deep inhalation for 5 days. CT angio preliminary findings of acute segmental/subsegmental PE in the L lower lobe with peripheral wedge-shaped consolidation with ground-glass opacity likely PNA and infarction and possible cavitation and small L pleural effusion. D-Dimer 547. Denies hx of clotting or family hx of clotting conditions. Pt has been using clomiphene for 6 weeks and works as a , seated for up to 10 hrs a day. Possibly provoked by use of clomiphene with sedentary lifestyle making pt more prone to clotting. EKG, NSR with no evidence of R heart strain, PESI score of 69.  Plan:  - Transition from heparin gtt to lovenox 100mg q12hrs per pulm recs  - F/U B/L LE doppler  - F/U pulm recs, CT suspicious for cavitary lesion  - F/U TTE

## 2021-11-06 NOTE — DISCHARGE NOTE PROVIDER - NSDCCPCAREPLAN_GEN_ALL_CORE_FT
PRINCIPAL DISCHARGE DIAGNOSIS  Diagnosis: Pulmonary embolus with infarction  Assessment and Plan of Treatment: You had a blood clot in your lungs that caused some local areas in the bottom left lung to die. The area will heal over time, but you need to take blood thinning medication to allow the clot to resolve.  Please take your Eliquis 2 tablets twice a day for 1 week starting tonight, 11/6. Starting on the evening of 11/13, take 1 tablet twice a day. Please call Dr. Klein's office to follow up so she can prescribe more Eliquis and determine when to get a repeat CT scan.      SECONDARY DISCHARGE DIAGNOSES  Diagnosis: Pneumonia  Assessment and Plan of Treatment: There was concern for pneumonia, but you do not need antibiotics, all the areas of injury are from the clot.  If you develop a cough, fever, chills, or start coughing up blood. Come back to the emergency room right away, because a pneumonia may have developed from the injured areas of lung.

## 2021-11-06 NOTE — ED PROVIDER NOTE - CLINICAL SUMMARY MEDICAL DECISION MAKING FREE TEXT BOX
40 y/o m no PMHx, no recent medication taken, c/o dry cough and pleuritic chest pain, pt febrile, tachycardic, no labored breathing, no respiratory distress, not vaccinated for COVID, Plan for labs, XR, provide Tylenol, give hydration to pt, pt possibly covid, pna, or PE. Dispo per results and reevaluation 42 y/o m no PMHx, no recent medication taken, c/o dry cough and pleuritic chest pain, pt febrile, tachycardic, no labored breathing, no respiratory distress, not vaccinated for COVID, Plan: labs, IVF, tylenol, CXR, high possibility of pneumonia/PE/covid+ discussed with pt.  Dispo per results and reevaluation

## 2021-11-06 NOTE — H&P ADULT - NSHPPHYSICALEXAM_GEN_ALL_CORE
VITALS: T(C): 36.9 (11-06-21 @ 05:17), Max: 38.2 (11-06-21 @ 00:18)  T(F): 98.4 (11-06-21 @ 05:17), Max: 100.7 (11-06-21 @ 00:18)  HR: 82 (11-06-21 @ 05:17) (82 - 111)  BP: 107/70 (11-06-21 @ 05:17) (105/71 - 108/69)  ABP: --  ABP(mean): --  RR: 18 (11-06-21 @ 05:17) (16 - 18)  SpO2: 95% (11-06-21 @ 05:17) (95% - 99%)    GENERAL: NAD, lying in bed comfortably  HEAD:  Atraumatic, Normocephalic  EYES: EOMI, PERRLA, conjunctiva and sclera clear  ENT: Moist mucous membranes  NECK: Supple, No JVD  CHEST/LUNG: Clear to auscultation bilaterally; No rales, rhonchi, wheezing, or rubs. Unlabored respirations  HEART: Regular rate and rhythm; No murmurs, rubs, or gallops  ABDOMEN: Bowel sounds present; Soft, Nontender, Nondistended. No hepatomegally  EXTREMITIES:  2+ Peripheral Pulses, brisk capillary refill. No clubbing, cyanosis, or edema  NERVOUS SYSTEM:  Alert & Oriented X3, speech clear. No deficits   MSK: FROM all 4 extremities, full and equal strength  SKIN: No rashes or lesions VITALS: T(C): 36.9 (11-06-21 @ 05:17), Max: 38.2 (11-06-21 @ 00:18)  T(F): 98.4 (11-06-21 @ 05:17), Max: 100.7 (11-06-21 @ 00:18)  HR: 82 (11-06-21 @ 05:17) (82 - 111)  BP: 107/70 (11-06-21 @ 05:17) (105/71 - 108/69)  ABP: --  ABP(mean): --  RR: 18 (11-06-21 @ 05:17) (16 - 18)  SpO2: 95% (11-06-21 @ 05:17) (95% - 99%)    GENERAL: NAD, lying in bed comfortably  HEAD:  Atraumatic, Normocephalic  EYES: EOMI, PERRLA, conjunctiva and sclera clear  ENT: Moist mucous membranes  NECK: Supple, No JVD  CHEST/LUNG: Reproducible L chest pain, lungs CTA bilaterally; No rales, rhonchi, wheezing, or rubs. Unlabored respirations  HEART: S1/S2, Regular rate and rhythm; No murmurs, rubs, or gallops  ABDOMEN: Bowel sounds present; Soft, Nontender, Nondistended. No masses or hepatosplenomegaly  EXTREMITIES:  2+ Peripheral Pulses, brisk capillary refill. No clubbing, cyanosis, or edema  NERVOUS SYSTEM:  Alert & Oriented X3, speech clear. No deficits   MSK: FROM all 4 extremities, full and equal strength  SKIN: No rashes or lesions

## 2021-11-06 NOTE — H&P ADULT - HISTORY OF PRESENT ILLNESS
42 y/o M (Bel[place] speaking) with no significant medical hx presented to the ED with complaints of dry cough, chest pain on deep inhalation for 5 days.     In the ED:  Initial vital signs: T: 100.7F, HR: 111, BP: 105/71, R: 16, SpO2: 99% on RA  Labs: significant for d-dimer 547, PTT 26.3, CRP 44.5  Imaging:   CT angio: *prelim* acute segmental/subsegmental LLL PE, large peripheral wedge-shaped consolidation in the left lower lobe with adjacent ground glass opacity, likely a combination of pneumonia and pulmonary infarction. Some gas lucency within the consolidated lung may signify cavitation. Small L pleural effusion  EKG: NSR   Medications: heparin gtt, CTX 1g, azithromycin 500mg  Consults: none  40 y/o M ([place]an speaking) with no significant medical hx presented to the ED with complaints of dry cough, chest pain on deep inhalation for 5 days. Pt states that he has been taking clomiphene for 6 weeks for male infertility and stopped taking it on 11/1 at onset of chest pain. Pain is worse with deep inhalation, coughing, burping and bearing down. At this time, he states that the pain is a 5/10 and describes it as sharp but only present with coughing and when pressure is applied to the area. Pain has been progressively worsening over the past 5-6 days and he began to "feel hot" but was uncertain of whether or not he had a fever while at home. He reports no sick contacts, denies sputum production rhinorrhea, hemoptysis, dyspnea, syncope, new leg swelling or pain.     In the ED:  Initial vital signs: T: 100.7F, HR: 111, BP: 105/71, R: 16, SpO2: 99% on RA  Labs: significant for d-dimer 547, CRP 44.5  Imaging:   CXR: LLL consolidation  CT angio: *prelim* acute segmental/subsegmental LLL PE, large peripheral wedge-shaped consolidation in the left lower lobe with adjacent ground glass opacity, likely a combination of pneumonia and pulmonary infarction. Some gas lucency within the consolidated lung may signify cavitation. Small L pleural effusion  EKG: NSR   Medications: heparin gtt, CTX 1g, azithromycin 500mg  Consults: none  42 y/o M ([place] speaking) with no significant medical hx presented to the ED with complaints of dry cough, chest pain on deep inhalation for 5 days. Pt states that he has been taking clomiphene for 6 weeks for male infertility and stopped taking it on 11/1 at onset of chest pain. Pain is worse with deep inhalation, coughing, burping and bearing down. At this time, he states that the pain is a 5/10 and describes it as sharp but only present with coughing and when pressure is applied to the area. Pain has been progressively worsening over the past 5-6 days and he began to "feel hot" but was uncertain of whether or not he had a fever while at home. He reports no sick contacts, denies sputum production rhinorrhea, hemoptysis, dyspnea, syncope, new leg swelling or pain.     In the ED:  Initial vital signs: T: 100.7F, HR: 111, BP: 105/71, R: 16, SpO2: 99% on RA  Labs: significant for d-dimer 547, CRP 44.5  Imaging:   CXR: LLL consolidation  CT angio: *prelim* acute segmental/subsegmental LLL PE, large peripheral wedge-shaped consolidation in the left lower lobe with adjacent ground glass opacity, likely a combination of pneumonia and pulmonary infarction. Some gas lucency within the consolidated lung may signify cavitation. Small L pleural effusion  EKG: NSR   Medications: heparin gtt, CTX 1g, azithromycin 500mg, tylenol  Consults: none

## 2021-11-06 NOTE — CONSULT NOTE ADULT - ATTENDING COMMENTS
42 yo M with hx of varicocele and androgen insufficiency for which he was taking clomiphine for 1 month until he developed pleuritic chest pain 6 days ago. + mild dry cough, no sig SOB, no constitutional symptoms, + low grade fever, no phlegm. , in car for 10 hrs/day. Found to have LLL segmental/subsegmental PE with large area of wedge shape infiltrate, likely infarction. More common to see infarction in distal emboli and in younger patients, he is otherwise hemodynamically stable and well appearing. Neg trop, neg BNP, EKG borderline sinus tachy, no RV strain. US of lungs as above, area of necrosis visualized, no obvious cavitation. Low concern for infection.     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 Pt should be instructed to return to ED if he develops worsening SOB, fever, or productive cough  - Can follow up with me in clinic in 1-2 weeks

## 2021-11-06 NOTE — H&P ADULT - NSHPLABSRESULTS_GEN_ALL_CORE
.  LABS:                         14.3   10.18 )-----------( 215      ( 06 Nov 2021 01:39 )             40.9     11-06    138  |  101  |  12  ----------------------------<  104<H>  4.0   |  28  |  1.26    Ca    9.7      06 Nov 2021 01:39    TPro  8.5<H>  /  Alb  4.7  /  TBili  0.5  /  DBili  x   /  AST  19  /  ALT  20  /  AlkPhos  78  11-06    PT/INR - ( 06 Nov 2021 01:39 )   PT: 12.7 sec;   INR: 1.06          PTT - ( 06 Nov 2021 01:39 )  PTT:26.3 sec    Serum Pro-Brain Natriuretic Peptide: 26 pg/mL (11-06 @ 04:40)    Lactate, Blood: 1.0 mmol/L (11-06 @ 01:39)      RADIOLOGY, EKG & ADDITIONAL TESTS: Reviewed.

## 2021-11-06 NOTE — ED PROVIDER NOTE - OBJECTIVE STATEMENT
40 y/o m with no PMHx. Pt c/o pain when he coughs and takes deep breaths. pt has pain in the lower left rib region. Pt states having a dry cough (5 days), today the patient feels being feverish and thinks he is hot but did not measure his temp and has a mild HA. Pt denies having a sore throat, rhinorrhea, rash, diarrhea or SOB.

## 2021-11-06 NOTE — H&P ADULT - ASSESSMENT
42 y/o M with no significant past medical hx presents with complaints of dry cough and chest pain with deep inhalation likely 2/2 to LLL new pulmonary embolism compounded by possible PNA. 40 y/o M with no significant past medical hx presents with complaints of dry cough and chest pain with deep inhalation likely 2/2 to LLL new pulmonary embolism possibly provoked by use of medication clomiphene. 42 y/o M with no significant past medical hx presents with complaints of dry cough and chest pain with deep inhalation found to have new LLL pulmonary embolism possibly provoked by use of medication clomiphene with clot risk increased by sedentary lifestyle. Also found to have suspicious cavitary lesion.

## 2021-11-06 NOTE — H&P ADULT - NSHPREVIEWOFSYSTEMS_GEN_ALL_CORE
CONSTITUTIONAL: No weakness, fevers or chills  EYES/ENT: No visual changes;  No vertigo or throat pain   NECK: No pain or stiffness  RESPIRATORY: No cough, wheezing, hemoptysis; No shortness of breath  CARDIOVASCULAR: No chest pain or palpitations  GASTROINTESTINAL: No abdominal or epigastric pain. No nausea, vomiting, or hematemesis; No diarrhea or constipation. No melena or hematochezia.  GENITOURINARY: No dysuria, frequency or hematuria  NEUROLOGICAL: No numbness or weakness  SKIN: No itching, burning, rashes, or lesions   All other review of systems is negative unless indicated above. CONSTITUTIONAL: (-) weakness, fevers or chills  EYES/ENT: (-) No visual changes;  No vertigo or throat pain   NECK: (-) pain or stiffness  RESPIRATORY: (+) cough (-) wheezing, hemoptysis, shortness of breath  CARDIOVASCULAR: (+) chest pain, (-) palpitations  GASTROINTESTINAL: (-) abdominal or epigastric pain. (-) nausea, vomiting, or hematemesis; (-) diarrhea or constipation. (-) melena or hematochezia.  GENITOURINARY: (-)dysuria, frequency or hematuria  NEUROLOGICAL: (-) numbness or weakness  SKIN: (-) itching, burning, rashes, or lesions   All other review of systems is negative unless indicated above.

## 2021-11-06 NOTE — CONSULT NOTE ADULT - SUBJECTIVE AND OBJECTIVE BOX
PULMONARY SERVICE INITIAL CONSULT NOTE  -------------------------------------------------------------    HPI:     40yo M minimal former-smoker, not COVID vaccinated, with hx of ?male infertility (on Clomiphene),   Presents with several days of dry cough and pleuritic chest pain,   Found with acute LLL PE with area of pulmonary infarction/cavitation     --    Pt is predominantly [place]-speaking. Reports near 1 week of mostly dry cough associated with left-sided pleuritic chest pain. No fevers/chills, HA, dizziness, SOB, hemoptysis, palpitations, abd pain, N/V, bowel changes, ext swelling, recent travel, or sick contacts. No hx of similar symptoms.   Was started on Clomiphene several weeks ago, and when chest pain started, he stopped the medication. No hx of prior cardiopulmonary disease, works as , with no known environmental/toxic exposures.     In ED, Tmax 100.7, otherwise stable vitals on RA  Labs unremarkable with no leukocytosis, negative trop/BNP, negative COVID/RVP  Chest CTA with LLL segmental/subsegmental pulmonary embolism with area of likely lung infarction vs possible cavitary lung lesion   Started on Heparin gtt    --    Pt seen and examined this evening.   Resting comfortably, still reports some left-sided pain.   No hx of pulmonary disease, cancer, or VTEs in the family; has not used Testosterone or other hormonal therapy in the past other than recently starting Clomiphene     Bedside U/S today  - Trace L pleural effusion, simple-appearing  - Area of L basal lung consolidation/possible necrosis         -------------------------------------------------------------  PAST MEDICAL & SURGICAL HISTORY:  Male infertility    S/P right inguinal hernia repair    H/O varicose vein stripping        FAMILY HISTORY:  Family history of cervical cancer (Mother)        SOCIAL HISTORY:  Smoking Status: [ ] Current, [ ] Former, (x] Never  Pack Years:    MEDICATIONS:  Pulmonary:    Antimicrobials:    Anticoagulants:  enoxaparin Injectable 100 milliGRAM(s) SubCutaneous every 12 hours    Onc:    GI/:    Endocrine:    Cardiac:    Other Medications:  acetaminophen     Tablet .. 650 milliGRAM(s) Oral every 6 hours PRN  influenza   Vaccine 0.5 milliLiter(s) IntraMuscular once      Allergies    No Known Allergies    Intolerances        Vital Signs Last 24 Hrs  T(C): 37.4 (06 Nov 2021 12:45), Max: 38.2 (06 Nov 2021 00:18)  T(F): 99.3 (06 Nov 2021 12:45), Max: 100.7 (06 Nov 2021 00:18)  HR: 82 (06 Nov 2021 12:45) (78 - 111)  BP: 103/64 (06 Nov 2021 12:45) (103/64 - 114/69)  BP(mean): --  RR: 18 (06 Nov 2021 12:45) (16 - 18)  SpO2: 95% (06 Nov 2021 12:45) (95% - 99%)        -------------------------------------------------------------  PHYSICAL EXAM:    GEN: Comfortable, in NAD  HEENT: NC/AT, PEERLA, MMM  CARDIAC: RRR, Normal S1/S2, No MRGs  PULMONARY: Crackles at L lung base; otherwise no wheezing/rhonchi - no accessory muscle use   ABDOMEN: Soft, NT/ND   EXT: No LE edema  NEURO: A&O x 3, CN II-XII grossly intact, moves all ext, sensation intact    -------------------------------------------------------------  LABS:        CBC Full  -  ( 06 Nov 2021 01:39 )  WBC Count : 10.18 K/uL  RBC Count : 4.58 M/uL  Hemoglobin : 14.3 g/dL  Hematocrit : 40.9 %  Platelet Count - Automated : 215 K/uL  Mean Cell Volume : 89.3 fl  Mean Cell Hemoglobin : 31.2 pg  Mean Cell Hemoglobin Concentration : 35.0 gm/dL  Auto Neutrophil # : 7.52 K/uL  Auto Lymphocyte # : 1.49 K/uL  Auto Monocyte # : 1.11 K/uL  Auto Eosinophil # : 0.02 K/uL  Auto Basophil # : 0.01 K/uL  Auto Neutrophil % : 73.9 %  Auto Lymphocyte % : 14.6 %  Auto Monocyte % : 10.9 %  Auto Eosinophil % : 0.2 %  Auto Basophil % : 0.1 %    11-06    138  |  101  |  12  ----------------------------<  104<H>  4.0   |  28  |  1.26    Ca    9.7      06 Nov 2021 01:39    TPro  8.5<H>  /  Alb  4.7  /  TBili  0.5  /  DBili  x   /  AST  19  /  ALT  20  /  AlkPhos  78  11-06    PT/INR - ( 06 Nov 2021 01:39 )   PT: 12.7 sec;   INR: 1.06          PTT - ( 06 Nov 2021 10:49 )  PTT:>200.0 sec                  -------------------------------------------------------------  RADIOLOGY & ADDITIONAL STUDIES:    EXAM:  [facility]ST [facility]                          PROCEDURE DATE:  11/06/2021          INTERPRETATION:  Final attending read:    Intravenous contrast: 95 cc of [id number]    Limited evaluation of the pulmonary artery due to suboptimal bolus opacification and respiratory motion. Filling defect in a segmental/subsegmental branch of the left lower lobe. Normal heart size. No evidence of increased right heart pressure. Moderate size consolidation in the right lower lobe associated with groundglass opacities in the left lower lobe and lingula segment of the left upper lobe. Small left pleural effusion. Opacity in the anterior mediastinum may represent fluid within the pericardial recess versus thymic residue. No mediastinal, hilar oraxillary lymphadenopathy. No thoracic aortic aneurysm. Lobulated splenic contour with focal splenic calcification the upper abdomen no significant bony abnormality.    IMPRESSION:    Study limited by suboptimal bolus opacification and respiratory motion. Filling defect in a segmental/subsegmental branch of left lower lobe is suspicious for pulmonary embolism.    Left lower lobe airspace opacities representing pulmonary infarction or pneumonia. Recommend clinical correlation. Small left pleural effusion.    Findings were discussed with Dr. Beverly at 1:40 PM 11/6/2021.        ---------------------------------------------------------------------------------------------------      CTA Chest With Contrast 11/6/2021 3:36 AM    Clinical indication:Chest pain. R/O pe    TECHNIQUE:  Imaging protocol: Computed tomographic angiography of the chest with contrast. 3D rendering (Not supervised by radiologist): MIP and/or 3D reconstructed images were created  by the technologist.    COMPARISON:  DX XRCHEST 11/6/2021 1:29 AM    FINDINGS:  Pulmonary arteries: There is acute segmental/subsegmental PE in the left lower lobe.  Aorta: Unremarkable. No aortic aneurysm. No aortic dissection.  Lungs: There is large peripheral wedge-shaped consolidation inthe left lower lobe with adjacent  ground-glass opacity, likely a combination of pneumonia and pulmonary infarction. Some gas lucency  within the consolidated lung may signify cavitation. Consolidation density and ground-glass opacity in  the periphery of the lingula is most likely atelectasis and/or pulmonary infarction.  Pleural spaces: Small volume left pleural effusion.  Heart: Equivocal right heart strain.  Esophagus: Unremarkable.  Lymph nodes: Unremarkable. No enlarged lymph nodes.  Bones/joints: Unremarkable. No acute fracture.  Soft tissues: Unremarkable.  Other findings: Study limited by lack of coronal and sagittal reconstructions.    IMPRESSION:  1. There is acute segmental/subsegmental PE in the left lower lobe.  2. There is large peripheral wedge-shaped consolidation in the left lower lobe with adjacent groundglass opacity, likely a combination of pneumonia and pulmonary infarction. Some gas lucency within  the consolidated lung may signify cavitation.  3. Consolidation density and ground-glass opacity in the periphery of the lingula is most likely  atelectasis and/or pulmonary infarction.  4. Small volume left pleural effusion.  5. Equivocal right heart strain.    Thank you for allowing us to participate in the care of your patient.  Dictated and Authenticated by: Sekou Lynn MD  11/06/2021 4:48 AM Eastern Time ([place] & [place])      The above report was submitted by the [facility] attending radiologist and copied to [employer] by resident Dr. Rossi.

## 2021-11-06 NOTE — H&P ADULT - PROBLEM SELECTOR PLAN 3
F: none  E: K<4, Mg<2  N: Regular  D: Heparin gtt    Code: Full  Dispo: ANGIE F: none  E: K<4, Mg<2  N: Regular  D: Lovenox    Code: Full  Dispo: ANGIE

## 2021-11-06 NOTE — ED ADULT NURSE NOTE - OBJECTIVE STATEMENT
Patient presents to the ED c/o cough, fever-rosa isela at home, left sided rib cage pain upon deep breathing and inspiration. Reports to have been ongoing for the past 5 days with worsening today. reports dry cough at home. denies any N/V/D, SOB, able to speak in full sentences without respiratory distress at this time, no acute distress noted.

## 2021-11-09 ENCOUNTER — NON-APPOINTMENT (OUTPATIENT)
Age: 41
End: 2021-11-09

## 2021-11-10 PROBLEM — N46.9 MALE INFERTILITY, UNSPECIFIED: Chronic | Status: ACTIVE | Noted: 2021-11-06

## 2021-11-11 DIAGNOSIS — I26.99 OTHER PULMONARY EMBOLISM WITHOUT ACUTE COR PULMONALE: ICD-10-CM

## 2021-11-11 DIAGNOSIS — R00.0 TACHYCARDIA, UNSPECIFIED: ICD-10-CM

## 2021-11-11 DIAGNOSIS — T38.5X5A ADVERSE EFFECT OF OTHER ESTROGENS AND PROGESTOGENS, INITIAL ENCOUNTER: ICD-10-CM

## 2021-11-11 DIAGNOSIS — N46.8 OTHER MALE INFERTILITY: ICD-10-CM

## 2021-11-11 DIAGNOSIS — J18.9 PNEUMONIA, UNSPECIFIED ORGANISM: ICD-10-CM

## 2021-11-11 LAB
CULTURE RESULTS: SIGNIFICANT CHANGE UP
CULTURE RESULTS: SIGNIFICANT CHANGE UP
SPECIMEN SOURCE: SIGNIFICANT CHANGE UP
SPECIMEN SOURCE: SIGNIFICANT CHANGE UP

## 2021-11-11 RX ORDER — APIXABAN 5 MG (74)
5 KIT ORAL
Refills: 0 | Status: ACTIVE | COMMUNITY

## 2021-11-11 RX ORDER — CLOMIPHENE CITRATE 50 MG/1
50 TABLET ORAL
Qty: 30 | Refills: 2 | Status: DISCONTINUED | COMMUNITY
Start: 2021-08-17 | End: 2021-11-11

## 2021-11-12 ENCOUNTER — APPOINTMENT (OUTPATIENT)
Dept: PULMONOLOGY | Facility: CLINIC | Age: 41
End: 2021-11-12
Payer: MEDICAID

## 2021-11-12 VITALS
OXYGEN SATURATION: 96 % | WEIGHT: 245 LBS | SYSTOLIC BLOOD PRESSURE: 113 MMHG | HEIGHT: 74 IN | BODY MASS INDEX: 31.44 KG/M2 | HEART RATE: 92 BPM | TEMPERATURE: 97.6 F | DIASTOLIC BLOOD PRESSURE: 73 MMHG

## 2021-11-12 DIAGNOSIS — Z80.49 FAMILY HISTORY OF MALIGNANT NEOPLASM OF OTHER GENITAL ORGANS: ICD-10-CM

## 2021-11-12 PROCEDURE — 99072 ADDL SUPL MATRL&STAF TM PHE: CPT

## 2021-11-12 PROCEDURE — 99204 OFFICE O/P NEW MOD 45 MIN: CPT

## 2021-11-12 PROCEDURE — 99214 OFFICE O/P EST MOD 30 MIN: CPT

## 2021-11-12 NOTE — ASSESSMENT
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 Pulmonary Embolism\par -Repeat CT chest in 6 weeks (end of December) to ensure area of infarct is resolving and there is no underlying mass lesion\par -Anticoagulate for 6 months with Eliquis 5 mg BID\par -6MWT in 2 months to assess oxygenation with exertion\par \par RTC: 2 months after CT chest with a 6MWT

## 2021-11-12 NOTE — PROCEDURE
[FreeTextEntry1] : 11/6/21 CTA CHEST PE PROTOCOL\par 1. There is acute segmental/subsegmental PE in the left lower lobe.\par 2. There is large peripheral wedge-shaped consolidation in the left lower lobe with adjacent groundglass opacity, likely a combination of pneumonia and\par pulmonary infarction. Some gas lucency within the consolidated lung may signify cavitation.\par 3. Consolidation density and ground-glass opacity in the periphery of the lingula is most likely atelectasis and/or pulmonary infarction.\par 4. Small volume left pleural effusion.\par 5. Equivocal right heart strain.\par

## 2021-11-12 NOTE — REASON FOR VISIT
[Follow-Up - From Hospitalization] : a follow-up visit after a recent hospitalization [Pulmonary Embolism] : pulmonary embolism [[employer] ] : provided by [employer]   [Time Spent: ____ minutes] : Total time spent using  services: [unfilled] minutes. The patient's primary language is not English thus required  services. [Interpreters_IDNumber] : [id number]

## 2021-11-12 NOTE — HISTORY OF PRESENT ILLNESS
[TextBox_4] : 41 M taking clomiphene for infertility presented with pleuritic chest pain of 5-6 days. Low grade fevers and tachycardia on admission with positive D-dimer, CT PE positive for LLL embolism with infarction and local necrosis, concern for cavitation. 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 Additionally, he was placed on clomid for hypogonadism 1 month prior. When he developed the chest pain he stopped the hormone medication.  He has no hx of PE/DVT, no family hx of DVT/PE. Discharged on apixaban. He is meeting with Dr. Castaneda to discuss further intervention re: male fertility. \par \par 11-12-21\par He feels better \par He coughed a little bit last Saturday and there was some blood. It has not happened again\par He does not have a cough\par He gets tired going up the stairs\par The pain in the left side is gone\par He can walk fine on level ground\par He is not having any bleeding of the gums or dark stools\par He wants to know if he can lift weights\par \par

## 2021-11-28 ENCOUNTER — EMERGENCY (EMERGENCY)
Facility: HOSPITAL | Age: 41
LOS: 1 days | Discharge: HOME | End: 2021-11-28
Attending: EMERGENCY MEDICINE | Admitting: EMERGENCY MEDICINE
Payer: COMMERCIAL

## 2021-11-28 VITALS
RESPIRATION RATE: 18 BRPM | SYSTOLIC BLOOD PRESSURE: 125 MMHG | OXYGEN SATURATION: 97 % | DIASTOLIC BLOOD PRESSURE: 80 MMHG | TEMPERATURE: 98 F | HEART RATE: 60 BPM

## 2021-11-28 VITALS
SYSTOLIC BLOOD PRESSURE: 132 MMHG | WEIGHT: 244.93 LBS | HEART RATE: 76 BPM | RESPIRATION RATE: 18 BRPM | DIASTOLIC BLOOD PRESSURE: 83 MMHG | TEMPERATURE: 98 F | OXYGEN SATURATION: 98 % | HEIGHT: 74 IN

## 2021-11-28 DIAGNOSIS — Z79.01 LONG TERM (CURRENT) USE OF ANTICOAGULANTS: ICD-10-CM

## 2021-11-28 DIAGNOSIS — R10.13 EPIGASTRIC PAIN: ICD-10-CM

## 2021-11-28 DIAGNOSIS — Z98.890 OTHER SPECIFIED POSTPROCEDURAL STATES: Chronic | ICD-10-CM

## 2021-11-28 DIAGNOSIS — R19.7 DIARRHEA, UNSPECIFIED: ICD-10-CM

## 2021-11-28 DIAGNOSIS — Z86.711 PERSONAL HISTORY OF PULMONARY EMBOLISM: ICD-10-CM

## 2021-11-28 LAB
ALBUMIN SERPL ELPH-MCNC: 4.4 G/DL — SIGNIFICANT CHANGE UP (ref 3.3–5)
ALP SERPL-CCNC: 83 U/L — SIGNIFICANT CHANGE UP (ref 40–120)
ALT FLD-CCNC: 32 U/L — SIGNIFICANT CHANGE UP (ref 10–45)
ANION GAP SERPL CALC-SCNC: 11 MMOL/L — SIGNIFICANT CHANGE UP (ref 5–17)
APPEARANCE UR: CLEAR — SIGNIFICANT CHANGE UP
AST SERPL-CCNC: 34 U/L — SIGNIFICANT CHANGE UP (ref 10–40)
BASOPHILS # BLD AUTO: 0.01 K/UL — SIGNIFICANT CHANGE UP (ref 0–0.2)
BASOPHILS NFR BLD AUTO: 0.2 % — SIGNIFICANT CHANGE UP (ref 0–2)
BILIRUB SERPL-MCNC: 0.5 MG/DL — SIGNIFICANT CHANGE UP (ref 0.2–1.2)
BILIRUB UR-MCNC: NEGATIVE — SIGNIFICANT CHANGE UP
BUN SERPL-MCNC: 10 MG/DL — SIGNIFICANT CHANGE UP (ref 7–23)
CALCIUM SERPL-MCNC: 10 MG/DL — SIGNIFICANT CHANGE UP (ref 8.4–10.5)
CHLORIDE SERPL-SCNC: 104 MMOL/L — SIGNIFICANT CHANGE UP (ref 96–108)
CO2 SERPL-SCNC: 25 MMOL/L — SIGNIFICANT CHANGE UP (ref 22–31)
COLOR SPEC: YELLOW — SIGNIFICANT CHANGE UP
CREAT SERPL-MCNC: 1.03 MG/DL — SIGNIFICANT CHANGE UP (ref 0.5–1.3)
DIFF PNL FLD: NEGATIVE — SIGNIFICANT CHANGE UP
EOSINOPHIL # BLD AUTO: 0.37 K/UL — SIGNIFICANT CHANGE UP (ref 0–0.5)
EOSINOPHIL NFR BLD AUTO: 6.2 % — HIGH (ref 0–6)
GLUCOSE SERPL-MCNC: 94 MG/DL — SIGNIFICANT CHANGE UP (ref 70–99)
GLUCOSE UR QL: NEGATIVE — SIGNIFICANT CHANGE UP
HCT VFR BLD CALC: 40.3 % — SIGNIFICANT CHANGE UP (ref 39–50)
HGB BLD-MCNC: 13.6 G/DL — SIGNIFICANT CHANGE UP (ref 13–17)
IMM GRANULOCYTES NFR BLD AUTO: 0.3 % — SIGNIFICANT CHANGE UP (ref 0–1.5)
KETONES UR-MCNC: NEGATIVE — SIGNIFICANT CHANGE UP
LEUKOCYTE ESTERASE UR-ACNC: NEGATIVE — SIGNIFICANT CHANGE UP
LIDOCAIN IGE QN: 15 U/L — SIGNIFICANT CHANGE UP (ref 7–60)
LYMPHOCYTES # BLD AUTO: 1.11 K/UL — SIGNIFICANT CHANGE UP (ref 1–3.3)
LYMPHOCYTES # BLD AUTO: 18.7 % — SIGNIFICANT CHANGE UP (ref 13–44)
MCHC RBC-ENTMCNC: 30.4 PG — SIGNIFICANT CHANGE UP (ref 27–34)
MCHC RBC-ENTMCNC: 33.7 GM/DL — SIGNIFICANT CHANGE UP (ref 32–36)
MCV RBC AUTO: 90.2 FL — SIGNIFICANT CHANGE UP (ref 80–100)
MONOCYTES # BLD AUTO: 0.32 K/UL — SIGNIFICANT CHANGE UP (ref 0–0.9)
MONOCYTES NFR BLD AUTO: 5.4 % — SIGNIFICANT CHANGE UP (ref 2–14)
NEUTROPHILS # BLD AUTO: 4.12 K/UL — SIGNIFICANT CHANGE UP (ref 1.8–7.4)
NEUTROPHILS NFR BLD AUTO: 69.2 % — SIGNIFICANT CHANGE UP (ref 43–77)
NITRITE UR-MCNC: NEGATIVE — SIGNIFICANT CHANGE UP
NRBC # BLD: 0 /100 WBCS — SIGNIFICANT CHANGE UP (ref 0–0)
PH UR: 6 — SIGNIFICANT CHANGE UP (ref 5–8)
PLATELET # BLD AUTO: 169 K/UL — SIGNIFICANT CHANGE UP (ref 150–400)
POTASSIUM SERPL-MCNC: 4.6 MMOL/L — SIGNIFICANT CHANGE UP (ref 3.5–5.3)
POTASSIUM SERPL-SCNC: 4.6 MMOL/L — SIGNIFICANT CHANGE UP (ref 3.5–5.3)
PROT SERPL-MCNC: 7.5 G/DL — SIGNIFICANT CHANGE UP (ref 6–8.3)
PROT UR-MCNC: NEGATIVE MG/DL — SIGNIFICANT CHANGE UP
RBC # BLD: 4.47 M/UL — SIGNIFICANT CHANGE UP (ref 4.2–5.8)
RBC # FLD: 13.2 % — SIGNIFICANT CHANGE UP (ref 10.3–14.5)
SODIUM SERPL-SCNC: 140 MMOL/L — SIGNIFICANT CHANGE UP (ref 135–145)
SP GR SPEC: 1.02 — SIGNIFICANT CHANGE UP (ref 1–1.03)
UROBILINOGEN FLD QL: 0.2 E.U./DL — SIGNIFICANT CHANGE UP
WBC # BLD: 5.95 K/UL — SIGNIFICANT CHANGE UP (ref 3.8–10.5)
WBC # FLD AUTO: 5.95 K/UL — SIGNIFICANT CHANGE UP (ref 3.8–10.5)

## 2021-11-28 PROCEDURE — 96374 THER/PROPH/DIAG INJ IV PUSH: CPT

## 2021-11-28 PROCEDURE — 99284 EMERGENCY DEPT VISIT MOD MDM: CPT

## 2021-11-28 PROCEDURE — 99284 EMERGENCY DEPT VISIT MOD MDM: CPT | Mod: 25

## 2021-11-28 PROCEDURE — 85025 COMPLETE CBC W/AUTO DIFF WBC: CPT

## 2021-11-28 PROCEDURE — 36415 COLL VENOUS BLD VENIPUNCTURE: CPT

## 2021-11-28 PROCEDURE — 81003 URINALYSIS AUTO W/O SCOPE: CPT

## 2021-11-28 PROCEDURE — 83690 ASSAY OF LIPASE: CPT

## 2021-11-28 PROCEDURE — 80053 COMPREHEN METABOLIC PANEL: CPT

## 2021-11-28 RX ORDER — FAMOTIDINE 10 MG/ML
20 INJECTION INTRAVENOUS ONCE
Refills: 0 | Status: COMPLETED | OUTPATIENT
Start: 2021-11-28 | End: 2021-11-28

## 2021-11-28 RX ORDER — SODIUM CHLORIDE 9 MG/ML
1000 INJECTION INTRAMUSCULAR; INTRAVENOUS; SUBCUTANEOUS ONCE
Refills: 0 | Status: COMPLETED | OUTPATIENT
Start: 2021-11-28 | End: 2021-11-28

## 2021-11-28 RX ADMIN — Medication 30 MILLILITER(S): at 14:35

## 2021-11-28 RX ADMIN — SODIUM CHLORIDE 1000 MILLILITER(S): 9 INJECTION INTRAMUSCULAR; INTRAVENOUS; SUBCUTANEOUS at 14:35

## 2021-11-28 RX ADMIN — FAMOTIDINE 20 MILLIGRAM(S): 10 INJECTION INTRAVENOUS at 14:36

## 2021-11-28 NOTE — ED PROVIDER NOTE - NSFOLLOWUPINSTRUCTIONS_ED_ALL_ED_FT
Diarrea en los adultos    Diarrhea, Adult      La diarrea consiste en deposiciones frecuentes, blandas o acuosas. La diarrea puede hacerlo sentir débil y deshidratarlo. La deshidratación puede causarle cansancio, sed, sequedad en la boca y disminución en la frecuencia con la que orina.    Generalmente, la diarrea dura entre 2 y 3 días. Sin embargo, puede durar más tiempo si se trata de un signo de algo más robina. Es importante tratar la diarrea arturo se lo haya indicado el médico.      Siga estas indicaciones en nelson casa:      Comida y bebida                 Siga estas recomendaciones arturo se lo haya indicado el médico:  •Marana abi solución de rehidratación oral (oral rehydration solution, ORS). Es un medicamento de venta gurvinder que ayuda a que el cuerpo recupere el equilibrio normal de nutrientes y agua. Se la encuentra en farmacias y tiendas minoristas.      •Noy abundantes líquidos, arturo agua, trocitos de hielo, jugos de fruta rebajados con agua y bebidas deportivas bajas en calorías. Puede beber leche también, si lo desea.      •Evite consumir líquidos que contengan mucha azúcar o cafeína, arturo bebidas energéticas, bebidas deportivas y refrescos.      •En la medida en que pueda, consuma alimentos blandos y fáciles de digerir en pequeñas cantidades. Estos alimentos incluyen bananas, compota de manzana, arroz, evita magras, tostadas y galletas saladas.      •Evite óscar alcohol.      •Evite los alimentos condimentados o con alto contenido de grasa.      Medicamentos     •Marana los medicamentos de venta gurvinder y los recetados solamente arturo se lo haya indicado el médico.      •Si le recetaron un antibiótico, tómelo arturo se lo haya indicado el médico. No deje de usar el antibiótico aunque comience a sentirse mejor.        Indicaciones generales    •Lávese las sandy frecuentemente usando agua y jabón. Use desinfectante para sandy si no dispone de agua y jabón. Las demás personas de la casa deben lavarse las sandy también. Las sandy deben lavarse:  •Después de usar el baño o cambiar un pañal.       •Antes de preparar, cocinar o servir la comida.       •Mientras cuida de abi persona enferma, o cuando visita a alguien en el hospital.         •Noy suficiente líquido arturo para mantener la orina de color amarillo pálido.      •Descanse en nelson casa mientras se recupera.      •Controle nelson afección para detectar cualquier cambio.      •Marana un baño caliente para ayudar a disminuir el ardor o el dolor causados por los episodios frecuentes de diarrea.      •Concurra a todas las visitas de control arturo se lo haya indicado el médico. [place] es importante.        Comuníquese con un médico si:    •Tiene fiebre.      •La diarrea empeora.      •Aparecen nuevos síntomas.      •No puede retener los líquidos.      •Se siente aturdido o mareado.      •Tiene dolor de vincent.      •Tiene calambres musculares.        Solicite ayuda inmediatamente si:    •Siente dolor en el pecho.      •Se siente muy débil o se desmaya.      •Tiene heces con regan, de color derick, o con aspecto alquitranado.      •Tiene dolor intenso, cólicos o distensión abdominal.      •Tiene problemas para respirar o respira muy rápidamente.      •Nelson corazón late muy rápidamente.      •Siente la piel fría y húmeda.      •Se siente confundido.    •Tiene signos de deshidratación, arturo los siguientes:  •Orina de color oscuro, muy escasa o falta de orina.      •Labios agrietados.      •Sequedad de boca.      •Ojos hundidos.      •Somnolencia.      •Debilidad.          Resumen    •La diarrea consiste en deposiciones frecuentes, blandas o acuosas. La diarrea puede hacerlo sentir débil y deshidratarlo.      •Noy suficiente líquido para mantener la orina de color amarillo pálido.      •Asegúrese de lavarse las sandy después de usar el baño. Use desinfectante para sandy si no dispone de agua y jabón.      •Comuníquese con un médico si la diarrea empeora o tiene nuevos síntomas.      •Busque ayuda de inmediato si tiene signos de deshidratación.      Esta información no tiene arturo fin reemplazar el consejo del médico. Asegúrese de hacerle al médico cualquier pregunta que tenga.

## 2021-11-28 NOTE — ED PROVIDER NOTE - PATIENT PORTAL LINK FT
You can access the FollowMyHealth Patient Portal offered by [facility] by registering at the following website: [email or web address]. By joining [employer]’s FollowMyHealth portal, you will also be able to view your health information using other applications (apps) compatible with our system.

## 2021-11-28 NOTE — ED PROVIDER NOTE - ATTENDING CONTRIBUTION TO CARE
several days of epigastric abdominal discomfort and non bloody loose diarrhea. well appearing, stable vs. labs done with normal wbc, normal lft, normal lipase. improved with gi cocktail. suspect gastritis/ viral process. plan symptomatic treatment and pmd/gi f/u. return if worsens

## 2021-11-28 NOTE — ED ADULT TRIAGE NOTE - CHIEF COMPLAINT QUOTE
Pt presents to ED c/o epigastric abdominal pain + diarrhea x 5 days. Denies nausea, vomiting, fever.

## 2021-11-28 NOTE — ED ADULT NURSE NOTE - OBJECTIVE STATEMENT
Patient c/o of 5 days of abdominal pain/epigastric pain w/ episodes of diarrhea, non-bloody when having pain, no nausea/vomitting, no fever or chills, no urinary symptoms.  Patient on Eliquis for PE.

## 2021-11-28 NOTE — ED PROVIDER NOTE - CLINICAL SUMMARY MEDICAL DECISION MAKING FREE TEXT BOX
40 y/o m presents c/o epigastric abd pain, diarrhea over the past 5 days; pt afebrile in ED, vss.  +mild epigastric tenderness on exam, will plan to check labs, urine, GI PCR if pt able to give a sample.  Not concerned for cholecystitis, no indication for emergent imaging currently, etiology more likely viral vs food related.  Will f/u labs, recheck after GI cocktail.

## 2021-11-28 NOTE — ED PROVIDER NOTE - OBJECTIVE STATEMENT
Used  # [id number]. 41y male with PMH significant for PE dx'ed 11/6 currently on eliquis presents c/o intermittent epigastric abdominal pain a/w nonbloody diarrhea x5d.  He initially attributed the pain to gas but comes to the ED because the pain has persisted.  Pt has taken herbal tea and Tums without relief.  He denies CP, fever/chills, vomiting, increased urinary frequency, dysuria, hematuria, scrotal pain/swelling, known sick contacts, recent travel. Used  # [id number]. 41y male with PMH significant for PE dx'ed 11/6 currently on eliquis presents c/o intermittent epigastric abdominal pain a/w nonbloody diarrhea x5d.  He initially attributed the pain to gas but comes to the ED because the pain has persisted.  Pt has taken herbal tea and Tums without relief.  Pt reports 2 loose bowel movements per day over the past few days.  He denies CP, fever/chills, vomiting, increased urinary frequency, dysuria, hematuria, scrotal pain/swelling, known sick contacts, recent travel.

## 2021-11-28 NOTE — ED PROVIDER NOTE - PROGRESS NOTE DETAILS
pt feeling better, tolerating PO, labs wnl, didn't provide stool sample in ED.  will d/c, encourage PO hydration, given rx maalox, to f/u with pmd, return to ED if sx worsen.

## 2021-11-29 ENCOUNTER — EMERGENCY (EMERGENCY)
Facility: HOSPITAL | Age: 41
LOS: 1 days | Discharge: HOME | End: 2021-11-29
Admitting: EMERGENCY MEDICINE
Payer: MEDICAID

## 2021-11-29 VITALS
SYSTOLIC BLOOD PRESSURE: 124 MMHG | RESPIRATION RATE: 18 BRPM | OXYGEN SATURATION: 99 % | HEART RATE: 66 BPM | DIASTOLIC BLOOD PRESSURE: 81 MMHG | HEIGHT: 74 IN | TEMPERATURE: 98 F

## 2021-11-29 DIAGNOSIS — Z86.718 PERSONAL HISTORY OF OTHER VENOUS THROMBOSIS AND EMBOLISM: ICD-10-CM

## 2021-11-29 DIAGNOSIS — R10.13 EPIGASTRIC PAIN: ICD-10-CM

## 2021-11-29 DIAGNOSIS — Z76.0 ENCOUNTER FOR ISSUE OF REPEAT PRESCRIPTION: ICD-10-CM

## 2021-11-29 DIAGNOSIS — Z98.890 OTHER SPECIFIED POSTPROCEDURAL STATES: Chronic | ICD-10-CM

## 2021-11-29 DIAGNOSIS — R19.7 DIARRHEA, UNSPECIFIED: ICD-10-CM

## 2021-11-29 DIAGNOSIS — Z79.01 LONG TERM (CURRENT) USE OF ANTICOAGULANTS: ICD-10-CM

## 2021-11-29 PROCEDURE — 99283 EMERGENCY DEPT VISIT LOW MDM: CPT

## 2021-11-29 PROCEDURE — 99053 MED SERV 10PM-8AM 24 HR FAC: CPT

## 2021-11-29 PROCEDURE — 99281 EMR DPT VST MAYX REQ PHY/QHP: CPT

## 2021-11-29 RX ADMIN — Medication 5 MILLILITER(S): at 03:09

## 2021-11-29 NOTE — ED PROVIDER NOTE - PATIENT PORTAL LINK FT
You can access the FollowMyHealth Patient Portal offered by [facility] by registering at the following website: [email or web address]. By joining [employer]’s FollowMyHealth portal, you will also be able to view your health information using other applications (apps) compatible with our system.

## 2021-11-29 NOTE — ED ADULT NURSE NOTE - OBJECTIVE STATEMENT
Received a 41 year old male with a chief complaint of abdominal pain. Patient is a return to ED from yesterday afternoon.

## 2021-11-29 NOTE — ED PROVIDER NOTE - CLINICAL SUMMARY MEDICAL DECISION MAKING FREE TEXT BOX
pt just seen in ED few hrs ago for same c/o - did not  maalox rx from pharm and is here requesting a dose, c/o epigastric discomfort, had normal labs few hrs ago hence no indication for any repeat labs at this time, well appearing, benign abd, given maalox, advised to avoid spicy/fried foods and caffeine and f/u w/pmd or gi

## 2021-11-29 NOTE — ED PROVIDER NOTE - OBJECTIVE STATEMENT
The pt is a 40 y/o M, who was seen in ED yest afternoon for epigastric pain and some diarrhea, has labs done and was dc'd w/rx for maalox - states that didn't make it to the pharm in time to  the rx, now presents to ED requesting maalox. States epigastric discomfort - same as yest, 2/10, constant and dull, non radiating. Denies cp, sob, n/v, fevers, chills, dizziness.

## 2021-11-29 NOTE — ED PROVIDER NOTE - NSFOLLOWUPINSTRUCTIONS_ED_ALL_ED_FT
tome Maalox según lo prescrito, coma comidas pequeñas y frecuentes, evite los alimentos picantes / fritos, evite la cafeína y el alcohol, kai un seguimiento con nelson médico pmd o gi

## 2022-01-02 ENCOUNTER — NON-APPOINTMENT (OUTPATIENT)
Age: 42
End: 2022-01-02

## 2022-01-05 ENCOUNTER — APPOINTMENT (OUTPATIENT)
Dept: PULMONOLOGY | Facility: CLINIC | Age: 42
End: 2022-01-05

## 2022-01-05 ENCOUNTER — APPOINTMENT (OUTPATIENT)
Dept: PULMONOLOGY | Facility: CLINIC | Age: 42
End: 2022-01-05
Payer: MEDICAID

## 2022-01-07 ENCOUNTER — APPOINTMENT (OUTPATIENT)
Dept: UROLOGY | Facility: CLINIC | Age: 42
End: 2022-01-07

## 2022-01-14 ENCOUNTER — OUTPATIENT (OUTPATIENT)
Dept: OUTPATIENT SERVICES | Facility: HOSPITAL | Age: 42
LOS: 1 days | End: 2022-01-14
Payer: COMMERCIAL

## 2022-01-14 ENCOUNTER — APPOINTMENT (OUTPATIENT)
Dept: CT IMAGING | Facility: HOSPITAL | Age: 42
End: 2022-01-14
Payer: MEDICAID

## 2022-01-14 DIAGNOSIS — Z98.890 OTHER SPECIFIED POSTPROCEDURAL STATES: Chronic | ICD-10-CM

## 2022-01-14 PROCEDURE — 71250 CT THORAX DX C-: CPT | Mod: 26

## 2022-01-14 PROCEDURE — 71250 CT THORAX DX C-: CPT

## 2022-01-19 ENCOUNTER — NON-APPOINTMENT (OUTPATIENT)
Age: 42
End: 2022-01-19

## 2022-01-19 RX ORDER — APIXABAN 5 MG/1
5 TABLET, FILM COATED ORAL
Qty: 60 | Refills: 5 | Status: ACTIVE | COMMUNITY
Start: 2021-11-12 | End: 1900-01-01

## 2022-01-20 PROBLEM — I26.99 PULMONARY EMBOLUS AND INFARCTION: Status: ACTIVE | Noted: 2021-11-11

## 2022-01-26 ENCOUNTER — NON-APPOINTMENT (OUTPATIENT)
Age: 42
End: 2022-01-26

## 2022-01-26 ENCOUNTER — APPOINTMENT (OUTPATIENT)
Dept: PULMONOLOGY | Facility: CLINIC | Age: 42
End: 2022-01-26
Payer: MEDICAID

## 2022-01-26 DIAGNOSIS — I26.99 OTHER PULMONARY EMBOLISM W/OUT ACUTE COR PULMONALE: ICD-10-CM

## 2022-01-26 PROCEDURE — 99215 OFFICE O/P EST HI 40 MIN: CPT | Mod: 95

## 2022-01-27 NOTE — PROCEDURE
[FreeTextEntry1] : Chest CT 1/14/22\par 1. Since 11/6/2021, there has been marked improvement in the consolidation and groundglass opacity in the left lower lobe and lingula. There is now only a small amount of atelectasis or scarring remaining.\par 2. Dilated main pulmonary artery, which may represent pulmonary hypertension.\par 3. There is again a small amount of atelectasis and volume loss in the right middle lobe.\par 4. Improvement in thymic hyperplasia.\par 5. Mildly aneurysmal aortic root, measuring 4.1 cm.\par *****\par 11/6/21 CTA CHEST PE PROTOCOL\par 1. There is acute segmental/subsegmental PE in the left lower lobe.\par 2. There is large peripheral wedge-shaped consolidation in the left lower lobe with adjacent groundglass opacity, likely a combination of pneumonia and\par pulmonary infarction. Some gas lucency within the consolidated lung may signify cavitation.\par 3. Consolidation density and ground-glass opacity in the periphery of the lingula is most likely atelectasis and/or pulmonary infarction.\par 4. Small volume left pleural effusion.\par 5. Equivocal right heart strain.\par

## 2022-01-27 NOTE — HISTORY OF PRESENT ILLNESS
[TextBox_4] : 41 M taking clomiphene for infertility presented with pleuritic chest pain of 5-6 days. Low grade fevers and tachycardia on admission with positive D-dimer, CT PE positive for LLL embolism with infarction and local necrosis, concern for cavitation. 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 Additionally, he was placed on clomid for hypogonadism 1 month prior. When he developed the chest pain he stopped the hormone medication.  He has no hx of PE/DVT, no family hx of DVT/PE. Discharged on apixaban. He is meeting with Dr. Castaneda to discuss further intervention re: male fertility. \par \par 1-5-21 \par \par He continues to be doing well - has no SOB, chest pain, palpitations, LE swelling. Has a mild cough with phlegm and some frequent throat clearing, but no other complaints. Has been on eliquis BID and no bleeding complications. He is going back to see urologist as he and his wife are trying to get pregnant, he may require hormonal therapy in the future.

## 2022-01-27 NOTE — ASSESSMENT
[FreeTextEntry1] : 42 yo M with hx of hyopgonadism and varicoceles s/p resection for which he was receiving clomiphene 50mg QOD x 1 month until he developed chest pain and cough, found to have small subsegmental LLL PE with large area of wedge infarction and possible cavitation likely provoked in setting of hormone usage and job (, 10 hr shifts at a time)\par \par Repeat CT chest 8 weeks later demonstrated near complete resolution of infarct with only small amount of atelectasis/scar remaining. He continues to have no respiratory or cardiac symptoms. CT suggestive of mild PA enlargement, although noncon study.\par \par 1. Pulmonary Embolism\par -Anticoagulate for 6 months with Eliquis 5 mg BID\par -Although CT scan with possible enlargement of PA, he has no symptoms, RV appears normal on CT. Low likelihood small subsegmental PE would contribute to development of PH or CTEPH\par -Advised pt to follow up if he ever develops symptoms, may consider TTE in future\par -If he requires hormonal therapy for family planning, will need to return to clinic to trend d-dimer and consider resuming anticoagulation given his last PE was likely provoked by testosterone and sedentary job\par \par RTC: as needed

## 2022-01-28 ENCOUNTER — APPOINTMENT (OUTPATIENT)
Dept: UROLOGY | Facility: CLINIC | Age: 42
End: 2022-01-28
Payer: MEDICAID

## 2022-01-28 VITALS
BODY MASS INDEX: 30.8 KG/M2 | TEMPERATURE: 97.6 F | HEIGHT: 74 IN | SYSTOLIC BLOOD PRESSURE: 120 MMHG | DIASTOLIC BLOOD PRESSURE: 74 MMHG | WEIGHT: 240 LBS | HEART RATE: 150 BPM

## 2022-01-28 PROCEDURE — 99213 OFFICE O/P EST LOW 20 MIN: CPT

## 2022-01-28 PROCEDURE — 99072 ADDL SUPL MATRL&STAF TM PHE: CPT

## 2022-01-28 NOTE — ASSESSMENT
[FreeTextEntry1] : hypogonadism\par hold clomid\par proceed to IVF\par improved semen parameters \par f/u PRN

## 2022-01-28 NOTE — HISTORY OF PRESENT ILLNESS
[FreeTextEntry1] : recent PE\par ?related to clomid\par stopped\par now on eliquis\par bilateral vx in DR\par repeat SA:\par 2 ml/1 mil/ml/28% motility

## 2022-05-12 ENCOUNTER — APPOINTMENT (OUTPATIENT)
Dept: UROLOGY | Facility: CLINIC | Age: 42
End: 2022-05-12
Payer: MEDICAID

## 2022-05-12 VITALS
HEART RATE: 65 BPM | SYSTOLIC BLOOD PRESSURE: 115 MMHG | TEMPERATURE: 97.3 F | OXYGEN SATURATION: 96 % | DIASTOLIC BLOOD PRESSURE: 79 MMHG

## 2022-05-12 PROCEDURE — 99214 OFFICE O/P EST MOD 30 MIN: CPT

## 2022-05-12 NOTE — HISTORY OF PRESENT ILLNESS
[FreeTextEntry1] : returns for followup\par underwent varicocelectomy in  \par previous cryptozoospermia\par was on clomid ended up with PE now off\par attempted IVF - rare motile sperm

## 2022-05-12 NOTE — ASSESSMENT
[FreeTextEntry1] : hyoognadsim\par repeat labs today\par repeat SA - cryopreserve if sperm noted\par reviewed role in office vs microTESE\par f/u 2 weeks\par

## 2022-05-12 NOTE — LETTER BODY
[Dear  ___] : Dear  [unfilled], [FreeTextEntry2] : Andrea Crisostomo MD\par [facility]\par [address], [address]\[place] [address] [FreeTextEntry1] : I had the pleasure of seeing TRAMAINE DALLAS, a 40 year old man,  to your patient Dianelys Dallas, in the office in consultation today. Please see the attached note for full details.\par \par Thank you very much for allowing me to participate in the care of this patient. If you have any questions please feel free to call me at any time. \par \par \par Sincerely yours,\par \par \par \par Abraham Castaneda MD, ANÍBAL\par Director, Male Fertility and Microsurgery\par  of Urology\par [facility]

## 2022-05-16 LAB
APPEARANCE: CLEAR
BACTERIA UR CULT: NORMAL
BACTERIA: NEGATIVE
BILIRUBIN URINE: NEGATIVE
BLOOD URINE: NEGATIVE
COLOR: NORMAL
ESTRADIOL SERPL-MCNC: 34 PG/ML
FSH SERPL-MCNC: 17.4 IU/L
GLUCOSE QUALITATIVE U: NEGATIVE
HYALINE CASTS: 0 /LPF
KETONES URINE: NEGATIVE
LEUKOCYTE ESTERASE URINE: NEGATIVE
LH SERPL-ACNC: 13.5 IU/L
MICROSCOPIC-UA: NORMAL
NITRITE URINE: NEGATIVE
PH URINE: 6
PROTEIN URINE: NEGATIVE
RED BLOOD CELLS URINE: 0 /HPF
SPECIFIC GRAVITY URINE: 1.01
SQUAMOUS EPITHELIAL CELLS: 0 /HPF
TESTOST FREE SERPL-MCNC: 8.1 PG/ML
TESTOST SERPL-MCNC: 368 NG/DL
UROBILINOGEN URINE: NORMAL
WHITE BLOOD CELLS URINE: 0 /HPF

## 2022-05-27 ENCOUNTER — APPOINTMENT (OUTPATIENT)
Dept: UROLOGY | Facility: CLINIC | Age: 42
End: 2022-05-27
Payer: MEDICAID

## 2022-05-27 VITALS — DIASTOLIC BLOOD PRESSURE: 79 MMHG | SYSTOLIC BLOOD PRESSURE: 134 MMHG | HEART RATE: 73 BPM | TEMPERATURE: 97.6 F

## 2022-05-27 PROCEDURE — 99213 OFFICE O/P EST LOW 20 MIN: CPT

## 2022-05-27 NOTE — HISTORY OF PRESENT ILLNESS
[FreeTextEntry1] : hypogonadism\par cryptozoospermia\par no repeat SA yet\par PE on clomid in past\par TT 360s\par FSH 17\par LH 13.5

## 2022-05-27 NOTE — ASSESSMENT
SUBJECTIVE:  Isela is a 26 year old with   Obstetric History       T1      L1     SAB0   TAB0   Ectopic0   Molar0   Multiple0   Live Births1     with last Patient's last menstrual period was 2017.  She presents for her positive pregnancy test early September and again 2 weeks later..  Her current complaints include nausea.   She has been feeling heart palpitations and chest pressure.  Taking [employer] prenatal vitamin with DHA  Still breastfeeding 1-2 x daily  Her  is in dental school.      PAST OBSTETRIC HISTORY:         PAST GYNECOLOGIC HISTORY:   Menstrual history includes irregular. Pap smear history includes No previous abnormalities.  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   homemaker                 Comment: since first child       FAMILY HISTORY:   Family History   Problem Relation  [FreeTextEntry1] : hypogoandims\par repeat SA with cryo\par if suboptimal for IVF consider TESE\par return after SA\par  Age of Onset   • Hypertension Maternal Aunt 55     CHF, pacemaker   • Hypertension Maternal Aunt    • Diabetes Paternal Grandfather    • Genetic Other      negative both sides   • Cancer Maternal Grandmother 75     breast        FAMILY STATUS:   Review of patient's family status indicates:    Mother                         Alive                     Father                         Alive                     Maternal Aunt                  Alive                     Maternal Aunt                  Alive                     Paternal Grandfather                                     Other                                                    Maternal Grandmother                                       GENETIC HISTORY:  See Epic form    REVIEW OF SYSTEMS: See Epic form    PHYSICAL EXAM:  Blood pressure 140/68, pulse 102, weight 68 kg, last menstrual period 2017, currently breastfeeding. Body mass index is 26.57 kg/m².     HEENT: Normal and no thyromegaly noted.    LUNGS: Clear to auscultation bilaterally.    HEART: Regular rate, regular rhythm and + murmer (possibly).  ABDOMEN: Benign, soft and non-tender, non-distended, with no masses or organomegaly and bowel sounds normoactive.    EXTREMITIES: Normal, no cyanosis, no clubbing and no edema.    PELVIC EXAM:  Vulva: grossly unremarkable, no lesions or masses noted and no erythema or discharge.  Vagina: normal size & caliber.  Cervix: Normal in appearance, no lesions or masses and no discharge or bleeding. No cervical motion tenderness.  Uterus: enlarged, 8-10 weeks size.  Adnexa: unremarkable, no fullness noted and no masses noted.    ASSESSMENT/PLAN   26 year old with   Obstetric History       T1      L1     SAB0   TAB0   Ectopic0   Molar0   Multiple0   Live Births1     who presents for confirmation of pregnancy.      1. Cervical neoplasm screen: Pap with reflex done  2. Monthly self-breast exam stressed.     3. Tdap needed at 27-36 weeks.   4. Nutrition, weight  gain, and exercise reviewed.   5. Prenatal vitamins and supplements reviewed.   6. Elevated BP and palpitations. Possible murmer. Repeat BP was ok but will have do consult with cardiology given how early in pregnancy this BP was noted. Last pregnancy she had no issues but does have family hx in 2 maternal aunts. Baseline labs drawn.  7. She is further along than she suspected based on uterine size--will do scan next week.  8. Discussed continuation of BF. Usually baby stops on his own by 20 weeks. She is not at risk for PTL or any concerns with this.    All questions answered.  Instructed to call the office at any time with questions. I spent 30 minutes with this patient and greater than 50% of time with this patient was spent in counseling and coordination of care.

## 2022-06-09 ENCOUNTER — NON-APPOINTMENT (OUTPATIENT)
Age: 42
End: 2022-06-09

## 2023-01-16 ENCOUNTER — NON-APPOINTMENT (OUTPATIENT)
Age: 43
End: 2023-01-16

## 2023-01-27 ENCOUNTER — APPOINTMENT (OUTPATIENT)
Dept: UROLOGY | Facility: CLINIC | Age: 43
End: 2023-01-27
Payer: MEDICAID

## 2023-01-27 VITALS
HEIGHT: 74 IN | TEMPERATURE: 97.5 F | SYSTOLIC BLOOD PRESSURE: 116 MMHG | WEIGHT: 245 LBS | DIASTOLIC BLOOD PRESSURE: 78 MMHG | BODY MASS INDEX: 31.44 KG/M2 | HEART RATE: 59 BPM

## 2023-01-27 PROCEDURE — 99214 OFFICE O/P EST MOD 30 MIN: CPT

## 2023-01-27 NOTE — ASSESSMENT
[FreeTextEntry1] : hypogonaidsm\par cryptozoospemria \par previously had pulmonary embolism when on clomid\par SA appears adequate for IVF\par repeat SA x 3 with cryo at IVF center for backup\par if fails consider TESE\par

## 2023-01-27 NOTE — PHYSICAL EXAM
[Urethral Meatus] : meatus normal [Penis Abnormality] : normal circumcised penis [Scrotum] : the scrotum was normal [Epididymis] : the epididymides were normal [Testes Tenderness] : no tenderness of the testes [Testes Mass (___cm)] : there were no testicular masses [FreeTextEntry1] : bilateral 3 cc testis. No evident varicocele.

## 2023-01-27 NOTE — LETTER BODY
[Dear  ___] : Dear  [unfilled], [FreeTextEntry2] : Shmuel Waite MD\par [facility]\par [address]\par [address]\par [place] [address] [FreeTextEntry1] : I had the pleasure of seeing TRAMAINE DALLAS, a 42 year old man,  to your patient Dianelys Dallas, in the office in consultation today. Please see the attached note for full details.\par \par Thank you very much for allowing me to participate in the care of this patient. If you have any questions please feel free to call me at any time. \par \par \par Sincerely yours,\par \par \par \par Abraham Castaneda MD, ANÍBAL\par Director, Male Fertility and Microsurgery\par  of Urology\par [facility]\par

## 2023-03-10 ENCOUNTER — APPOINTMENT (OUTPATIENT)
Dept: UROLOGY | Facility: CLINIC | Age: 43
End: 2023-03-10

## 2023-04-04 ENCOUNTER — APPOINTMENT (OUTPATIENT)
Dept: PULMONOLOGY | Facility: CLINIC | Age: 43
End: 2023-04-04

## 2023-05-09 ENCOUNTER — EMERGENCY (EMERGENCY)
Facility: HOSPITAL | Age: 43
LOS: 1 days | Discharge: HOME | End: 2023-05-09
Admitting: EMERGENCY MEDICINE
Payer: COMMERCIAL

## 2023-05-09 VITALS
RESPIRATION RATE: 18 BRPM | SYSTOLIC BLOOD PRESSURE: 121 MMHG | WEIGHT: 250 LBS | HEART RATE: 72 BPM | OXYGEN SATURATION: 98 % | TEMPERATURE: 98 F | DIASTOLIC BLOOD PRESSURE: 84 MMHG

## 2023-05-09 DIAGNOSIS — Z98.890 OTHER SPECIFIED POSTPROCEDURAL STATES: Chronic | ICD-10-CM

## 2023-05-09 DIAGNOSIS — R21 RASH AND OTHER NONSPECIFIC SKIN ERUPTION: ICD-10-CM

## 2023-05-09 DIAGNOSIS — Z86.711 PERSONAL HISTORY OF PULMONARY EMBOLISM: ICD-10-CM

## 2023-05-09 DIAGNOSIS — E29.1 TESTICULAR HYPOFUNCTION: ICD-10-CM

## 2023-05-09 DIAGNOSIS — Z79.01 LONG TERM (CURRENT) USE OF ANTICOAGULANTS: ICD-10-CM

## 2023-05-09 PROCEDURE — 36415 COLL VENOUS BLD VENIPUNCTURE: CPT

## 2023-05-09 PROCEDURE — 99283 EMERGENCY DEPT VISIT LOW MDM: CPT

## 2023-05-09 PROCEDURE — 86780 TREPONEMA PALLIDUM: CPT

## 2023-05-09 RX ORDER — HYDROCORTISONE 1 %
1 OINTMENT (GRAM) TOPICAL
Qty: 1 | Refills: 0
Start: 2023-05-09

## 2023-05-09 NOTE — ED PROVIDER NOTE - NSFOLLOWUPINSTRUCTIONS_ED_ALL_ED_FT
Use only a small amount of the topical steroid (hydrocortisone) to the most itchy areas twice daily for no longer than 1 to 2 weeks.    Follow-up with your primary care doctor or dermatologist.  Return to the emergency room for new or worsening symptoms.  -----------------  Use solo abi pequeña cantidad del esteroide tópico (hydrocortisone) en las áreas con mayor picazón dos veces al día mariola no más de 1 a 2 semanas.    Seguimiento con nelson médico de atención primaria o dermatólogo.  Regrese a la aileen de emergencias si hay síntomas nuevos o que empeoran.    =================  PITYRIASIS ROSEA    ¿Qué es la pitiriasis rosada?  La pitiriasis es un sarpullido que produce ronchas pequeñas con comezón en el área del estómago, la espalda, el pecho, los brazos y las piernas. El sarpullido suele durar entre cuatro y seis semanas, tiny en algunas personas puede durar meses.    La pitiriasis rosada es más común en niños mayores y adultos jóvenes.    ¿Cuáles son las causas de la pitiriasis rosada?  La causa se desconoce, tiny no parece contagiarse fácilmente de persona a persona.    ¿Cuáles son los síntomas de la pitiriasis rosada?  En muchas personas, el sarpullido comienza con abi roncha redonda u ovalada. La roncha suele medir entre 1 y 2 pulgadas (2.5 a 5 cm) de ancho, tiny podría ser más antonia. Un día o dos más tarde, aparecen muchas ronchas más pequeñas. Sin embargo, no en todas las personas aparece la roncha antonia antes que el chidi del sarpullido.    El color de las ronchas puede variar según la persona. Pueden ser de color marrón rojizo, casas, marrón oscuro o shabbir oscuro (imagen 1 e imagen 2).    Las ronchas podrían:    ?Aparecer en el área del estómago, la espalda, el pecho, los brazos y las piernas    ?Esparcirse en forma de "abeto" o "árbol de Aniket" en la espalda    ?Producir comezón    ?Tener escamas    En los niños, las ronchas a veces aparecen en la shelly y el cuero cabelludo.    ¿Existe alguna prueba para detectar la pitiriasis rosada?  Feliciano vez. Nelson médico o enfermero a menudo puede determinar si tiene el padecimiento observando david síntomas y haciéndole un examen. Sin embargo, es posible que raspe suavemente el sarpullido o que kai otra prueba para óscar abi muestra de nelson piel. Con algunas pruebas de la muestra de piel, el médico puede determinar si tiene pitiriasis rosada u otro problema.    ¿Hay algo que pueda hacer por mi cuenta para sentirme mejor?  Sí. Puede hacer lo siguiente:    ?[place] un tipo especial de baño conocido arturo baño de armaan. Utilice agua tibia, no caliente.    ?Use loción o crema humectante sin perfume en la piel.    ?Trate de mantener el cuerpo fresco.    ¿Cómo se trata la pitiriasis rosada?  La mayoría de las personas no necesita tratamiento. Si los síntomas le resultan molestos, el médico podría recetarle cremas o ungüentos para aliviar la comezón. En casos aislados, los médicos recetan otras medicinas o un tipo especial de tratamiento con luces llamado “fototerapia”.

## 2023-05-09 NOTE — ED ADULT TRIAGE NOTE - CHIEF COMPLAINT QUOTE
Pt Luxem[place]ish speaking only w no PMH reports rash that started 2 week ago becoming worst. taking prenatal vitamins for fertility X1 months. Denies fever, cough, N/V.

## 2023-05-09 NOTE — ED PROVIDER NOTE - OBJECTIVE STATEMENT
42-year-old male with a history of medication induced PE, hypogonadism presents to ED with pruritic rash.  He first developed an itchy patch to the left upper arm 2 weeks ago.  In the past week he has developed smaller similar lesions across his torso, mainly anterior and lateral, less on the back.   he has had no fever or chills, headache, stiff neck, rhinorrhea or sore throat, cough/chest pain or shortness of breath, abdominal pain, nausea or vomiting, or other acute symptoms.    No new meds or foods.

## 2023-05-09 NOTE — ED PROVIDER NOTE - CARE PROVIDER_API CALL
Annika Mclain)  Dermatology [facility]  [address], [address]  [place] [address]  Phone: ([phone]  Fax: ()-  Follow Up Time:     Vianney Malagon)  Dermatology  [address] [place] [address], [place] [address]  Phone: [phone]  Fax: [phone]  Follow Up Time:

## 2023-05-09 NOTE — ED PROVIDER NOTE - CARE PROVIDERS DIRECT ADDRESSES
,DirectAddress_Unknown,[email or web address]

## 2023-05-09 NOTE — ED ADULT NURSE NOTE - CHIEF COMPLAINT QUOTE
Pt Vietnamese speaking only w no PMH reports rash that started 2 week ago becoming worst. taking prenatal vitamins for fertility X1 months. Denies fever, cough, N/V.

## 2023-05-09 NOTE — ED PROVIDER NOTE - PATIENT PORTAL LINK FT
You can access the FollowMyHealth Patient Portal offered by [facility] by registering at the following website: [email or web address]. By joining [employer]’s FollowMyHealth portal, you will also be able to view your health information using other applications (apps) compatible with our system.

## 2023-05-09 NOTE — ED ADULT NURSE NOTE - OBJECTIVE STATEMENT
42y male presents to ED c/o pruritic rash to left arm and torso, worsening. Denies respitatory symptoms. A&Ox4.

## 2023-05-09 NOTE — ED PROVIDER NOTE - CLINICAL SUMMARY MEDICAL DECISION MAKING FREE TEXT BOX
Patient with nonspecific rash to  torso, sparing face, lower body, palms and soles.  There is a herald patch to the left arm,  and the rash is suggestive of pityriasis rosea.  Patient is afebrile and otherwise well-appearing.  Other considerations would be syphilis and tinea corporis.  Will do syphilis screening.  Does not require specific treatment other than for the itchiness; will prescribe low potency topical steroid and have patient follow-up with PCP/dermatologist.

## 2023-05-09 NOTE — ED PROVIDER NOTE - PHYSICAL EXAMINATION
CONSTITUTIONAL: NAD   SKIN: Normal color and turgor.  Solitary ovoid pinkish-brown plaque (apx 2x5 cm) to left upper arm in region of antecubital fossa.  Three similar plaques (1x2 cm area) to lower chest-upper abd region; multiple small pinkish maculopapular lesions ( up to 1 cm) to upper torso/axillary region bilaterally.    HEAD: NC/AT.  EYES: Conjunctiva clear. EOMI. PERRL.    ENT: Airway clear. Normal voice.   RESPIRATORY:  Normal work of breathing. Lungs CTAB.  CARDIOVASCULAR:  RRR, S1S2. No M/R/G.      GI:  Abdomen soft, nontender.    MSK: Neck supple.  No LE edema or calf tenderness. No joint swelling or ROM limitation.  NEURO: Alert; CN: grossly intact. Speech clear.  WING. Gait steady.

## 2023-05-10 LAB — T PALLIDUM AB TITR SER: NEGATIVE — SIGNIFICANT CHANGE UP

## 2023-06-09 ENCOUNTER — APPOINTMENT (OUTPATIENT)
Dept: DERMATOLOGY | Facility: CLINIC | Age: 43
End: 2023-06-09

## 2023-08-10 ENCOUNTER — APPOINTMENT (OUTPATIENT)
Dept: UROLOGY | Facility: CLINIC | Age: 43
End: 2023-08-10
Payer: MEDICAID

## 2023-08-10 VITALS
TEMPERATURE: 97.8 F | WEIGHT: 245 LBS | DIASTOLIC BLOOD PRESSURE: 75 MMHG | BODY MASS INDEX: 31.44 KG/M2 | HEART RATE: 76 BPM | SYSTOLIC BLOOD PRESSURE: 116 MMHG | HEIGHT: 74 IN

## 2023-08-10 DIAGNOSIS — E29.1 TESTICULAR HYPOFUNCTION: ICD-10-CM

## 2023-08-10 PROCEDURE — 99214 OFFICE O/P EST MOD 30 MIN: CPT

## 2023-08-10 NOTE — ASSESSMENT
[FreeTextEntry1] : hypogonadism await repeat SA repeat TT FSH LH E2 PRL if no sperm noted advised to repeat x 2 with cryo of any sperm noted if remains azoo for likley future microTESE poor candidate for clomid given previous PE  post void dribbling UA UCx.

## 2023-08-10 NOTE — HISTORY OF PRESENT ILLNESS
[FreeTextEntry1] : 43 yo. h/o bilateral varicocelectomy and hypogonadism  to Dianelys Dallas, 35 yo. Everything ok on her side. Returns for follow up.  Followed at [facility], also being seen at [facility]. Were told sperm is not enough for IVF ?  Previous SA 01/2023 1.5 mL/0.2 mill/mL/5% motility/0% morphology.  Just did a new SA at . Not on any medication now, previously had PE on clomid, not on eliquis anymore.  Previous bloodwork 05/2022   FSH 17.4 LH 13.5 E2 34  46 XY Karyotype Negative Y Chromosome.   Reports Premature ejaculation, doesn't report ED. Reports post void dribbling.

## 2023-08-10 NOTE — PHYSICAL EXAM
[Urethral Meatus] : meatus normal [Penis Abnormality] : normal uncircumcised penis [Scrotum] : the scrotum was normal [FreeTextEntry1] : Bilateral 3 cc testis.

## 2023-08-14 LAB
APPEARANCE: CLEAR
BACTERIA UR CULT: NORMAL
BACTERIA: NEGATIVE /HPF
BILIRUBIN URINE: NEGATIVE
BLOOD URINE: NEGATIVE
CAST: 0 /LPF
COLOR: YELLOW
EPITHELIAL CELLS: 0 /HPF
ESTRADIOL SERPL-MCNC: 42 PG/ML
FSH SERPL-MCNC: 18.5 IU/L
GLUCOSE QUALITATIVE U: NEGATIVE MG/DL
KETONES URINE: NEGATIVE MG/DL
LEUKOCYTE ESTERASE URINE: NEGATIVE
LH SERPL-ACNC: 16.3 IU/L
MICROSCOPIC-UA: NORMAL
NITRITE URINE: NEGATIVE
PH URINE: 6
PROLACTIN SERPL-MCNC: 11.4 NG/ML
PROTEIN URINE: NEGATIVE MG/DL
RED BLOOD CELLS URINE: 0 /HPF
SPECIFIC GRAVITY URINE: 1.01
UROBILINOGEN URINE: 0.2 MG/DL
WHITE BLOOD CELLS URINE: 0 /HPF

## 2023-08-16 ENCOUNTER — NON-APPOINTMENT (OUTPATIENT)
Age: 43
End: 2023-08-16

## 2023-08-16 LAB
TESTOST FREE SERPL-MCNC: 0.1 PG/ML
TESTOST SERPL-MCNC: 341 NG/DL

## 2023-08-18 ENCOUNTER — APPOINTMENT (OUTPATIENT)
Dept: UROLOGY | Facility: CLINIC | Age: 43
End: 2023-08-18

## 2023-08-31 ENCOUNTER — APPOINTMENT (OUTPATIENT)
Dept: UROLOGY | Facility: CLINIC | Age: 43
End: 2023-08-31

## 2023-09-12 ENCOUNTER — APPOINTMENT (OUTPATIENT)
Dept: UROLOGY | Facility: CLINIC | Age: 43
End: 2023-09-12

## 2023-10-09 NOTE — ED ADULT NURSE NOTE - CAS EDP DISCH DISPOSITION ADMI
Thank you for choosing [address] [place] [address] Group  To Do:  FOR Geovanni Angeles    Continue to arrange for colonoscopy  Follow up in 6 months  Continue with iron supplementation  Have blood tests done in December
ER Holding

## 2023-12-13 NOTE — DISCHARGE NOTE NURSING/CASE MANAGEMENT/SOCIAL WORK - NSDCPEELIQUIS_GEN_ALL_CORE
[FreeTextEntry1] : Exacerbation of end-stage varus knee arthritis.  Long discussion about the treatment options.  Need for weight loss discussed.  He reports only minimal temporary relief after the corticosteroid injection.  He reports a bit better but still mild relief with Euflexxa series 3 months ago.  Not sure many options left here.  Trial of Zilretta injection today as a last resort.  Advised consultation with our total joint replacement team regarding further treatment options.  He still having significant discomfort over his left foot.  I had advised a consultation with Dr. Ji.  I reviewed the notes and nothing structural was noted.  He is asking for a second opinion on this.  Will set him up with another one of our foot and ankle specialists.
Apixaban/Eliquis - Compliance/Apixaban/Eliquis - Dietary Advice/Apixaban/Eliquis - Follow up monitoring/Apixaban/Eliquis - Potential for adverse drug reactions and interactions

## 2024-02-22 ENCOUNTER — NON-APPOINTMENT (OUTPATIENT)
Age: 44
End: 2024-02-22

## 2024-02-27 ENCOUNTER — APPOINTMENT (OUTPATIENT)
Dept: UROLOGY | Facility: CLINIC | Age: 44
End: 2024-02-27
Payer: MEDICAID

## 2024-02-27 VITALS
BODY MASS INDEX: 32.21 KG/M2 | HEIGHT: 74 IN | DIASTOLIC BLOOD PRESSURE: 73 MMHG | SYSTOLIC BLOOD PRESSURE: 110 MMHG | HEART RATE: 71 BPM | WEIGHT: 251 LBS

## 2024-02-27 PROCEDURE — 99214 OFFICE O/P EST MOD 30 MIN: CPT

## 2024-02-27 NOTE — LETTER BODY
[Dear  ___] : Dear  [unfilled], [FreeTextEntry2] : Cyndie Acosta MD [facility] [address], [address] [place] [address] [FreeTextEntry1] : I had the pleasure of seeing TRAMAINE DALLAS, a 43 year old man,  to your patient Dianelys Dallas, in the office in consultation today. Please see the attached note for full details.  Thank you very much for allowing me to participate in the care of this patient. If you have any questions please feel free to call me at any time.    Sincerely yours,    Abraham Castaneda MD, ANÍBAL Director, Male Fertility and Microsurgery  of [facility]

## 2024-02-27 NOTE — HISTORY OF PRESENT ILLNESS
[FreeTextEntry1] : 43M h/o bilateral varicocelectomy and hypogonadism  to Dianelys Dallas, 35 yo. Everything ok on her side last seen 08/10/23  01/2023 1.5 mL/0.2 mill/mL/5% motility/0% morphology 46 XY Karyotype Negative Y Chromosome Previous PE on Clomid since [facility] told them they could either do an extended sperm semen analysis for cryo vs TESE reports has had weight 10 lb weight loss  sa 8/10/23 vol 3.5 quantity not sufficient for concentration, motility, morphology 3 motile sperm noted outside grid  labs 8/14/23 E2 42 LH 16.3 Prolactin 11.4 FSH 18.5  Free T 0.1

## 2024-02-27 NOTE — ASSESSMENT
[FreeTextEntry1] : hypogonadism cryptozospermia normal hormone testing discussed extended search semen analysis with cryopreservation vs TESE vs mTESE move forward with extended search SA w/ cryopreservation can consider TESE vs mTESE if unable to find sperm proceed with IVF